# Patient Record
Sex: MALE | Race: WHITE | NOT HISPANIC OR LATINO | Employment: FULL TIME | URBAN - METROPOLITAN AREA
[De-identification: names, ages, dates, MRNs, and addresses within clinical notes are randomized per-mention and may not be internally consistent; named-entity substitution may affect disease eponyms.]

---

## 2017-02-15 ENCOUNTER — OFFICE VISIT (OUTPATIENT)
Dept: FAMILY MEDICINE CLINIC | Facility: CLINIC | Age: 57
End: 2017-02-15

## 2017-02-15 VITALS
DIASTOLIC BLOOD PRESSURE: 88 MMHG | BODY MASS INDEX: 27.87 KG/M2 | SYSTOLIC BLOOD PRESSURE: 130 MMHG | HEIGHT: 70 IN | HEART RATE: 63 BPM | OXYGEN SATURATION: 98 % | RESPIRATION RATE: 18 BRPM | WEIGHT: 194.7 LBS

## 2017-02-15 DIAGNOSIS — I10 ESSENTIAL HYPERTENSION: Primary | ICD-10-CM

## 2017-02-15 DIAGNOSIS — R35.1 NOCTURIA: ICD-10-CM

## 2017-02-15 PROCEDURE — 99386 PREV VISIT NEW AGE 40-64: CPT | Performed by: NURSE PRACTITIONER

## 2017-02-15 RX ORDER — LOSARTAN POTASSIUM 25 MG/1
25 TABLET ORAL DAILY
Qty: 90 TABLET | Refills: 2 | Status: SHIPPED | OUTPATIENT
Start: 2017-02-15 | End: 2017-10-30 | Stop reason: SINTOL

## 2017-02-15 RX ORDER — METOPROLOL SUCCINATE 50 MG/1
50 TABLET, EXTENDED RELEASE ORAL DAILY
Qty: 90 TABLET | Refills: 2 | Status: SHIPPED | OUTPATIENT
Start: 2017-02-15 | End: 2017-11-17 | Stop reason: SDUPTHER

## 2017-02-15 RX ORDER — METOPROLOL SUCCINATE 50 MG/1
TABLET, EXTENDED RELEASE ORAL
COMMUNITY
Start: 2017-01-30 | End: 2017-02-15 | Stop reason: SDUPTHER

## 2017-02-15 RX ORDER — LOSARTAN POTASSIUM 25 MG/1
TABLET ORAL
COMMUNITY
Start: 2017-01-26 | End: 2017-02-15 | Stop reason: SDUPTHER

## 2017-02-15 RX ORDER — TRIAMTERENE AND HYDROCHLOROTHIAZIDE 37.5; 25 MG/1; MG/1
1 TABLET ORAL DAILY
COMMUNITY
End: 2017-02-15 | Stop reason: SDUPTHER

## 2017-02-15 RX ORDER — TRIAMTERENE AND HYDROCHLOROTHIAZIDE 37.5; 25 MG/1; MG/1
1 TABLET ORAL DAILY
Qty: 90 TABLET | Refills: 2 | Status: SHIPPED | OUTPATIENT
Start: 2017-02-15 | End: 2018-01-18

## 2017-02-15 NOTE — PROGRESS NOTES
"Subjective   Shon Sheikh is a 56 y.o. male. Here to establish care and get refills on his medications.  PMHX: Htn  PSHX rhinoplasty   PFHX: HTN parents  Diabetes: Father  Recent move from [place]  Employed as a manager of a [employer].  Exercises daily.    Chief Complaint   Patient presents with   • Establish Care     1st visit   • Hypertension     would like medication refills.     Social History   Substance Use Topics   • Smoking status: Never Smoker   • Smokeless tobacco: Never Used   • Alcohol use 1.8 oz/week     1 Glasses of wine, 1 Cans of beer, 1 Shots of liquor per week       History of Present Illness     The following portions of the patient's history were reviewed and updated as appropriate: allergies, current medications, past social history and problem list.  Review of Systems   Genitourinary: Positive for frequency.        Nocturia   All other systems reviewed and are negative.      Objective   Vitals:    02/15/17 0802   BP: 130/88   Pulse: 63   Resp: 18   SpO2: 98%   Weight: 194 lb 11.2 oz (88.3 kg)   Height: 69.5\" (176.5 cm)     Body mass index is 28.34 kg/(m^2).    Physical Exam   Constitutional: He appears well-developed and well-nourished. No distress.   HENT:   Head: Normocephalic.   Right Ear: External ear normal.   Left Ear: External ear normal.   Mouth/Throat: Oropharynx is clear and moist.   Eyes: Conjunctivae and EOM are normal.   Neck: Neck supple.   Cardiovascular: Normal rate and regular rhythm.    Pulmonary/Chest: Effort normal and breath sounds normal.   Abdominal: Soft. Bowel sounds are normal.   Musculoskeletal: Normal range of motion.   Neurological: He is alert.   Skin: Skin is warm.   Nursing note and vitals reviewed.    Discussed his nocturia and gave him the option of placing him on a medication or going to see urology and he did not want to do either. PSA numbers in the past were normal and will try to get his medical records from last physician.    Assessment/Plan "   Problem List Items Addressed This Visit     None      Visit Diagnoses     Essential hypertension    -  Primary    Relevant Medications    losartan (COZAAR) 25 MG tablet    metoprolol succinate XL (TOPROL-XL) 50 MG 24 hr tablet    triamterene-hydrochlorothiazide (MAXZIDE-25) 37.5-25 MG per tablet    Nocturia

## 2017-04-26 ENCOUNTER — OFFICE VISIT (OUTPATIENT)
Dept: FAMILY MEDICINE CLINIC | Facility: CLINIC | Age: 57
End: 2017-04-26

## 2017-04-26 VITALS
WEIGHT: 194.7 LBS | OXYGEN SATURATION: 97 % | DIASTOLIC BLOOD PRESSURE: 86 MMHG | SYSTOLIC BLOOD PRESSURE: 128 MMHG | HEART RATE: 67 BPM | HEIGHT: 70 IN | BODY MASS INDEX: 27.87 KG/M2

## 2017-04-26 DIAGNOSIS — G47.30 SLEEP APNEA, UNSPECIFIED TYPE: Primary | ICD-10-CM

## 2017-04-26 DIAGNOSIS — L98.9 SKIN LESION OF SCALP: ICD-10-CM

## 2017-04-26 PROCEDURE — 99213 OFFICE O/P EST LOW 20 MIN: CPT | Performed by: NURSE PRACTITIONER

## 2017-04-26 RX ORDER — ASPIRIN 81 MG/1
81 TABLET ORAL DAILY
COMMUNITY
End: 2023-01-27

## 2017-04-26 NOTE — PROGRESS NOTES
"Subjective   Shon Sheikh is a 56 y.o. male.     Chief Complaint   Patient presents with   • Breathing Problem     Having diffiuculty breathing through nose. Broke nose in 2007 and had surgery 2007 snores at night and stops breathing somtimes    • Suspicious Skin Lesion     three on top of head     Social History   Substance Use Topics   • Smoking status: Never Smoker   • Smokeless tobacco: Never Used   • Alcohol use 1.8 oz/week     1 Glasses of wine, 1 Cans of beer, 1 Shots of liquor per week       History of Present Illness      Is having trouble breathing through his nose for the past 2 years. Compensates by mouth breathing. Had Rhinoplasty in 2008 and was better but now going downhill.  Reports that he snores a lot. Has never had a sleep study.  Is having trouble sleeping    Spots on the top of his head and he noticed about a week ago. No pain, pruritis or burning. Have not changed in color or symmetry according to his girlfriend    Review of Systems   Respiratory:        Difficulty breathing through nose.    Skin:        Spots on  Top of head       Objective   Vitals:    04/26/17 0838   BP: 128/86   Pulse: 67   SpO2: 97%   Weight: 194 lb 11.2 oz (88.3 kg)   Height: 69.5\" (176.5 cm)     Body mass index is 28.34 kg/(m^2).    Physical Exam   Constitutional: He appears well-developed and well-nourished. No distress.   HENT:   Head: Normocephalic.   Right Ear: External ear normal.   Left Ear: External ear normal.   Nose: Nose normal.   Mouth/Throat: Oropharynx is clear and moist.   Eyes: EOM are normal. Pupils are equal, round, and reactive to light.   Neck: Neck supple.   Cardiovascular: Normal rate and regular rhythm.    Pulmonary/Chest: Effort normal and breath sounds normal.   Musculoskeletal: Normal range of motion.   Neurological: He is alert.   Skin: Skin is warm.   3 small macular lesion to top of scalp, no bleeding or asymmetry or change in color.   Psychiatric: He has a normal mood and affect.   Nursing " note and vitals reviewed.      Assessment/Plan   Problem List Items Addressed This Visit     None      Visit Diagnoses     Sleep apnea, unspecified type    -  Primary    Relevant Orders    Ambulatory Referral to ENT (Otolaryngology)    Skin lesion of scalp

## 2017-10-05 ENCOUNTER — CLINICAL SUPPORT (OUTPATIENT)
Dept: FAMILY MEDICINE CLINIC | Facility: CLINIC | Age: 57
End: 2017-10-05

## 2017-10-05 DIAGNOSIS — Z23 NEED FOR VACCINATION: Primary | ICD-10-CM

## 2017-10-05 PROCEDURE — 90686 IIV4 VACC NO PRSV 0.5 ML IM: CPT | Performed by: NURSE PRACTITIONER

## 2017-10-05 PROCEDURE — 90471 IMMUNIZATION ADMIN: CPT | Performed by: NURSE PRACTITIONER

## 2017-10-25 ENCOUNTER — OFFICE VISIT (OUTPATIENT)
Dept: FAMILY MEDICINE CLINIC | Facility: CLINIC | Age: 57
End: 2017-10-25

## 2017-10-25 VITALS
HEIGHT: 70 IN | WEIGHT: 192 LBS | BODY MASS INDEX: 27.49 KG/M2 | HEART RATE: 80 BPM | DIASTOLIC BLOOD PRESSURE: 88 MMHG | OXYGEN SATURATION: 98 % | SYSTOLIC BLOOD PRESSURE: 132 MMHG

## 2017-10-25 DIAGNOSIS — N40.1 BENIGN PROSTATIC HYPERPLASIA WITH NOCTURIA: ICD-10-CM

## 2017-10-25 DIAGNOSIS — Z00.00 ANNUAL PHYSICAL EXAM: Primary | ICD-10-CM

## 2017-10-25 DIAGNOSIS — R35.1 BENIGN PROSTATIC HYPERPLASIA WITH NOCTURIA: ICD-10-CM

## 2017-10-25 DIAGNOSIS — I10 ESSENTIAL HYPERTENSION: ICD-10-CM

## 2017-10-25 LAB
ALBUMIN SERPL-MCNC: 4.5 G/DL (ref 3.5–5.2)
ALBUMIN/GLOB SERPL: 1.7 G/DL
ALP SERPL-CCNC: 60 U/L (ref 39–117)
ALT SERPL-CCNC: 22 U/L (ref 1–41)
AST SERPL-CCNC: 24 U/L (ref 1–40)
BILIRUB SERPL-MCNC: 0.5 MG/DL (ref 0.1–1.2)
BUN SERPL-MCNC: 21 MG/DL (ref 6–20)
BUN/CREAT SERPL: 15.3 (ref 7–25)
CALCIUM SERPL-MCNC: 9.5 MG/DL (ref 8.6–10.5)
CHLORIDE SERPL-SCNC: 101 MMOL/L (ref 98–107)
CHOLEST SERPL-MCNC: 219 MG/DL (ref 0–200)
CHOLEST/HDLC SERPL: 5.62 {RATIO}
CO2 SERPL-SCNC: 29 MMOL/L (ref 22–29)
CREAT SERPL-MCNC: 1.37 MG/DL (ref 0.76–1.27)
GFR SERPLBLD CREATININE-BSD FMLA CKD-EPI: 54 ML/MIN/1.73
GFR SERPLBLD CREATININE-BSD FMLA CKD-EPI: 65 ML/MIN/1.73
GLOBULIN SER CALC-MCNC: 2.7 GM/DL
GLUCOSE SERPL-MCNC: 91 MG/DL (ref 65–99)
HDLC SERPL-MCNC: 39 MG/DL (ref 40–60)
LDLC SERPL CALC-MCNC: 155 MG/DL (ref 0–100)
POTASSIUM SERPL-SCNC: 4.6 MMOL/L (ref 3.5–5.2)
PROT SERPL-MCNC: 7.2 G/DL (ref 6–8.5)
PSA SERPL-MCNC: 2.54 NG/ML (ref 0–4)
SODIUM SERPL-SCNC: 141 MMOL/L (ref 136–145)
TRIGL SERPL-MCNC: 124 MG/DL (ref 0–150)
VLDLC SERPL CALC-MCNC: 24.8 MG/DL (ref 5–40)

## 2017-10-25 PROCEDURE — 99396 PREV VISIT EST AGE 40-64: CPT | Performed by: NURSE PRACTITIONER

## 2017-10-25 NOTE — PROGRESS NOTES
"Shon Sheikh is a 57 y.o. male.  Seen 10/25/2017    Assessment/Plan   Problem List Items Addressed This Visit     None      Visit Diagnoses     Annual physical exam    -  Primary    Relevant Orders    Comprehensive Metabolic Panel    Lipid Panel With / Chol / HDL Ratio    Benign prostatic hyperplasia with nocturia        Relevant Orders    PSA    Essential hypertension          PMHX: Htn stable on his medications  PSHX:Rhinoplasty, hernia repair  PFHX: Father recently diagnosed with pancreatic cancer  Exercises a couple times a week.  Recent trouble with nocturia         No Follow-up on file.  There are no Patient Instructions on file for this visit.    Subjective     Chief Complaint   Patient presents with   • Annual Exam     Social History   Substance Use Topics   • Smoking status: Never Smoker   • Smokeless tobacco: Never Used   • Alcohol use 1.8 oz/week     1 Glasses of wine, 1 Cans of beer, 1 Shots of liquor per week       History of Present Illness     The following portions of the patient's history were reviewed and updated as appropriate:PMHroutine: Social history , Past Medical History, Surgical history , Allergies, Current Medications, Active Problem List, Family History and Health Maintenance    Review of Systems   Constitutional: Negative for activity change, appetite change and fatigue.   Genitourinary: Positive for frequency.       Objective   Vitals:    10/25/17 0803   BP: 132/88   Pulse: 80   SpO2: 98%   Weight: 192 lb (87.1 kg)   Height: 69.5\" (176.5 cm)     Body mass index is 27.95 kg/(m^2).  Physical Exam   Constitutional: He appears well-developed and well-nourished. No distress.   HENT:   Head: Normocephalic and atraumatic.   Right Ear: External ear normal.   Left Ear: External ear normal.   Mouth/Throat: Oropharynx is clear and moist.   Eyes: EOM are normal. Pupils are equal, round, and reactive to light.   Cardiovascular: Normal rate and regular rhythm.    Pulmonary/Chest: Effort normal and " breath sounds normal.   Abdominal: Soft. Bowel sounds are normal.   Musculoskeletal: Normal range of motion.   Neurological: He is alert.   Skin: Skin is warm and dry.   Nursing note and vitals reviewed.    Reviewed Data:  No results found for any previous visit.    Will call with lab results, I believe he is starting to have some BPH symptoms, mostly at night has nocturia, psa's in the past have been normal. Will draw a PSA today and discuss results tomorrow/.

## 2017-10-30 DIAGNOSIS — I10 ESSENTIAL HYPERTENSION: Primary | ICD-10-CM

## 2017-10-30 RX ORDER — AMLODIPINE BESYLATE 5 MG/1
5 TABLET ORAL DAILY
Qty: 30 TABLET | Refills: 1 | Status: SHIPPED | OUTPATIENT
Start: 2017-10-30 | End: 2017-11-07 | Stop reason: SDUPTHER

## 2017-11-01 ENCOUNTER — TELEPHONE (OUTPATIENT)
Dept: FAMILY MEDICINE CLINIC | Facility: CLINIC | Age: 57
End: 2017-11-01

## 2017-11-07 DIAGNOSIS — I10 ESSENTIAL HYPERTENSION: ICD-10-CM

## 2017-11-07 RX ORDER — AMLODIPINE BESYLATE 5 MG/1
5 TABLET ORAL DAILY
Qty: 90 TABLET | Refills: 0 | Status: SHIPPED | OUTPATIENT
Start: 2017-11-07 | End: 2017-11-20

## 2017-11-17 RX ORDER — LOSARTAN POTASSIUM 25 MG/1
TABLET ORAL
Qty: 90 TABLET | Refills: 1 | Status: SHIPPED | OUTPATIENT
Start: 2017-11-17 | End: 2018-01-17

## 2017-11-17 RX ORDER — METOPROLOL SUCCINATE 50 MG/1
TABLET, EXTENDED RELEASE ORAL
Qty: 90 TABLET | Refills: 1 | Status: SHIPPED | OUTPATIENT
Start: 2017-11-17 | End: 2018-03-01 | Stop reason: SDUPTHER

## 2017-11-20 DIAGNOSIS — I10 ESSENTIAL HYPERTENSION: Primary | ICD-10-CM

## 2017-11-20 RX ORDER — AMLODIPINE BESYLATE 10 MG/1
10 TABLET ORAL DAILY
Qty: 30 TABLET | Refills: 2 | Status: SHIPPED | OUTPATIENT
Start: 2017-11-20 | End: 2018-01-27 | Stop reason: SDUPTHER

## 2017-12-04 ENCOUNTER — TELEPHONE (OUTPATIENT)
Dept: FAMILY MEDICINE CLINIC | Facility: CLINIC | Age: 57
End: 2017-12-04

## 2017-12-04 NOTE — TELEPHONE ENCOUNTER
----- Message from Odell Ball MD sent at 12/4/2017 11:47 AM EST -----  Regarding: FW: Visit Follow-Up Question  Contact: [phone]  Please let Shon know that Merry will be back next week and will review B/P readings at that time.  ----- Message -----     From: Eleni Phillips MA     Sent: 12/4/2017   9:23 AM       To: CARLO Arteaga  Subject: FW: Visit Follow-Up Question                         ----- Message -----     From: Amanda Sanders MA     Sent: 12/4/2017   7:41 AM       To: Eleni Phillips MA  Subject: FW: Visit Follow-Up Question                         ----- Message -----     From: Shon Sheikh     Sent: 12/2/2017  12:15 PM       To: Deya Shepard [facility]  Subject: RE: Visit Follow-Up Question                     Merry,    Below are my BP readings for the past 11 days.    Date Systolic Diastolic  22-Nov 122 88  23-Nov 120 90  24-Nov 100 80  25-Nov 26-Nov 100 86  27-Nov 110 82  28-Nov 29-Nov 30-Nov    1-Dec 122 94  2-Dec 120 82    Cr Maria  ----- Message -----  From: CARLO Iqbal  Sent: 11/20/2017  4:19 PM EST  To: Shon Sheikh  Subject: RE: Visit Follow-Up Question    Mr. Sheikh,  I am going to increase your amlodipine to 10 mgs daily and I want you to keep a BP log and let me know.I need 4 readings a week    ----- Message -----     From: Shon Sheikh     Sent: 11/20/2017  3:04 PM EST       To: CARLO Iqbal  Subject: Visit Follow-Up Question    Merry    Below are my BP readings for the past two weeks.  These were mostly taken first thing in the morning.  I'm thinking my diastolic reading is running a little high.  I'm looking for your thoughts.    Date Systolic Diastolic  2-Nov 122       92  3-Nov 120             90  4-Nov 122             92  5-Nov 124             80  6-Nov 130             90  7-Nov 120             88  8-Nov 126             98  9-Nov 120             90  10-Nov 120             90  11-Nov 100             80  12-Nov 110              82  13-Nov 108             80  14-Nov 129           100  15-Nov 116             80  16-Nov    17-Nov    18-Nov 114             90  19-Nov    20-Nov 122             90    Regards,    Cr

## 2017-12-14 ENCOUNTER — TELEPHONE (OUTPATIENT)
Dept: FAMILY MEDICINE CLINIC | Facility: CLINIC | Age: 57
End: 2017-12-14

## 2017-12-18 ENCOUNTER — TELEPHONE (OUTPATIENT)
Dept: FAMILY MEDICINE CLINIC | Facility: CLINIC | Age: 57
End: 2017-12-18

## 2017-12-18 DIAGNOSIS — R94.4 DECREASED GFR: Primary | ICD-10-CM

## 2017-12-18 DIAGNOSIS — R89.9 ABNORMAL LABORATORY TEST: Primary | ICD-10-CM

## 2017-12-18 LAB
ALBUMIN SERPL-MCNC: 4.5 G/DL (ref 3.5–5.2)
ALBUMIN/GLOB SERPL: 1.6 G/DL
ALP SERPL-CCNC: 74 U/L (ref 39–117)
ALT SERPL-CCNC: 25 U/L (ref 1–41)
AST SERPL-CCNC: 27 U/L (ref 1–40)
BILIRUB SERPL-MCNC: 0.3 MG/DL (ref 0.1–1.2)
BUN SERPL-MCNC: 29 MG/DL (ref 6–20)
BUN/CREAT SERPL: 22.5 (ref 7–25)
CALCIUM SERPL-MCNC: 9.6 MG/DL (ref 8.6–10.5)
CHLORIDE SERPL-SCNC: 100 MMOL/L (ref 98–107)
CO2 SERPL-SCNC: 33.9 MMOL/L (ref 22–29)
CREAT SERPL-MCNC: 1.29 MG/DL (ref 0.76–1.27)
GFR SERPLBLD CREATININE-BSD FMLA CKD-EPI: 58 ML/MIN/1.73
GFR SERPLBLD CREATININE-BSD FMLA CKD-EPI: 70 ML/MIN/1.73
GLOBULIN SER CALC-MCNC: 2.8 GM/DL
GLUCOSE SERPL-MCNC: 89 MG/DL (ref 65–99)
POTASSIUM SERPL-SCNC: 4.4 MMOL/L (ref 3.5–5.2)
PROT SERPL-MCNC: 7.3 G/DL (ref 6–8.5)
SODIUM SERPL-SCNC: 140 MMOL/L (ref 136–145)

## 2018-01-03 ENCOUNTER — TELEPHONE (OUTPATIENT)
Dept: FAMILY MEDICINE CLINIC | Facility: CLINIC | Age: 58
End: 2018-01-03

## 2018-01-03 NOTE — TELEPHONE ENCOUNTER
He is out of town until the 5th. I did ask him to make an appointment when he gets back into town.

## 2018-01-17 ENCOUNTER — OFFICE VISIT (OUTPATIENT)
Dept: FAMILY MEDICINE CLINIC | Facility: CLINIC | Age: 58
End: 2018-01-17

## 2018-01-17 ENCOUNTER — TELEPHONE (OUTPATIENT)
Dept: FAMILY MEDICINE CLINIC | Facility: CLINIC | Age: 58
End: 2018-01-17

## 2018-01-17 VITALS
WEIGHT: 193 LBS | DIASTOLIC BLOOD PRESSURE: 100 MMHG | SYSTOLIC BLOOD PRESSURE: 138 MMHG | HEART RATE: 74 BPM | HEIGHT: 70 IN | BODY MASS INDEX: 27.63 KG/M2 | OXYGEN SATURATION: 98 %

## 2018-01-17 DIAGNOSIS — R39.11 BENIGN PROSTATIC HYPERPLASIA WITH URINARY HESITANCY: Primary | ICD-10-CM

## 2018-01-17 DIAGNOSIS — N40.1 BENIGN PROSTATIC HYPERPLASIA WITH URINARY HESITANCY: Primary | ICD-10-CM

## 2018-01-17 DIAGNOSIS — R42 DIZZY: ICD-10-CM

## 2018-01-17 DIAGNOSIS — I10 ESSENTIAL HYPERTENSION: ICD-10-CM

## 2018-01-17 PROCEDURE — 99213 OFFICE O/P EST LOW 20 MIN: CPT | Performed by: NURSE PRACTITIONER

## 2018-01-17 RX ORDER — TAMSULOSIN HYDROCHLORIDE 0.4 MG/1
1 CAPSULE ORAL NIGHTLY
Qty: 30 CAPSULE | Refills: 1 | Status: SHIPPED | OUTPATIENT
Start: 2018-01-17 | End: 2018-03-04 | Stop reason: SDUPTHER

## 2018-01-17 NOTE — TELEPHONE ENCOUNTER
Jhoan Sousa!  I would make sure that he is compliant with a low sodium diet, especially coming off of the holidays.  You are welcome to send him here as well.  Dr. Jones and I often use chlorthalidone instead of HCTZ, so you could always try stopping the maxide and starting chlorthalidone.  Let me know if you would like us to see him, and I will work on getting him an appointment.  Thanks!  Veronica

## 2018-01-17 NOTE — PATIENT INSTRUCTIONS
Hypertension  Hypertension, commonly called high blood pressure, is when the force of blood pumping through your arteries is too strong. Your arteries are the blood vessels that carry blood from your heart throughout your body. A blood pressure reading consists of a higher number over a lower number, such as 110/72. The higher number (systolic) is the pressure inside your arteries when your heart pumps. The lower number (diastolic) is the pressure inside your arteries when your heart relaxes. Ideally you want your blood pressure below 120/80.  Hypertension forces your heart to work harder to pump blood. Your arteries may become narrow or stiff. Having untreated or uncontrolled hypertension can cause heart attack, stroke, kidney disease, and other problems.  What increases the risk?  Some risk factors for high blood pressure are controllable. Others are not.  Risk factors you cannot control include:  · Race. You may be at higher risk if you are .  · Age. Risk increases with age.  · Gender. Men are at higher risk than women before age 45 years. After age 65, women are at higher risk than men.  Risk factors you can control include:  · Not getting enough exercise or physical activity.  · Being overweight.  · Getting too much fat, sugar, calories, or salt in your diet.  · Drinking too much alcohol.  What are the signs or symptoms?  Hypertension does not usually cause signs or symptoms. Extremely high blood pressure (hypertensive crisis) may cause headache, anxiety, shortness of breath, and nosebleed.  How is this diagnosed?  To check if you have hypertension, your health care provider will measure your blood pressure while you are seated, with your arm held at the level of your heart. It should be measured at least twice using the same arm. Certain conditions can cause a difference in blood pressure between your right and left arms. A blood pressure reading that is higher than normal on one occasion does  not mean that you need treatment. If it is not clear whether you have high blood pressure, you may be asked to return on a different day to have your blood pressure checked again. Or, you may be asked to monitor your blood pressure at home for 1 or more weeks.  How is this treated?  Treating high blood pressure includes making lifestyle changes and possibly taking medicine. Living a healthy lifestyle can help lower high blood pressure. You may need to change some of your habits.  Lifestyle changes may include:  · Following the DASH diet. This diet is high in fruits, vegetables, and whole grains. It is low in salt, red meat, and added sugars.  · Keep your sodium intake below 2,300 mg per day.  · Getting at least 30-45 minutes of aerobic exercise at least 4 times per week.  · Losing weight if necessary.  · Not smoking.  · Limiting alcoholic beverages.  · Learning ways to reduce stress.  Your health care provider may prescribe medicine if lifestyle changes are not enough to get your blood pressure under control, and if one of the following is true:  · You are 18-59 years of age and your systolic blood pressure is above 140.  · You are 60 years of age or older, and your systolic blood pressure is above 150.  · Your diastolic blood pressure is above 90.  · You have diabetes, and your systolic blood pressure is over 140 or your diastolic blood pressure is over 90.  · You have kidney disease and your blood pressure is above 140/90.  · You have heart disease and your blood pressure is above 140/90.  Your personal target blood pressure may vary depending on your medical conditions, your age, and other factors.  Follow these instructions at home:  · Have your blood pressure rechecked as directed by your health care provider.  · Take medicines only as directed by your health care provider. Follow the directions carefully. Blood pressure medicines must be taken as prescribed. The medicine does not work as well when you skip  doses. Skipping doses also puts you at risk for problems.  · Do not smoke.  · Monitor your blood pressure at home as directed by your health care provider.  Contact a health care provider if:  · You think you are having a reaction to medicines taken.  · You have recurrent headaches or feel dizzy.  · You have swelling in your ankles.  · You have trouble with your vision.  Get help right away if:  · You develop a severe headache or confusion.  · You have unusual weakness, numbness, or feel faint.  · You have severe chest or abdominal pain.  · You vomit repeatedly.  · You have trouble breathing.  This information is not intended to replace advice given to you by your health care provider. Make sure you discuss any questions you have with your health care provider.  Document Released: 12/18/2006 Document Revised: 05/25/2017 Document Reviewed: 10/10/2014  [employer] Interactive Patient Education © 2017 [employer]

## 2018-01-17 NOTE — PROGRESS NOTES
"Shon Sheikh is a 57 y.o. male.Patient presents for a hypertension check. He does monitor his blood pressure at home.   He has not been working out much but walks daily.  He also reports that he has been having trouble with his urine stream being weak and sometimes he has problems emptying his bladder.    Also reports that sometimes when he stands up fast he feels dizzy for a couple of seconds      Assessment/Plan   Problem List Items Addressed This Visit     None      Visit Diagnoses     Benign prostatic hyperplasia with urinary hesitancy    -  Primary    Relevant Medications    tamsulosin (FLOMAX) 0.4 MG capsule 24 hr capsule    Essential hypertension        Dizzy                 No Follow-up on file.  There are no Patient Instructions on file for this visit.    Chief Complaint   Patient presents with   • Hypertension     Social History   Substance Use Topics   • Smoking status: Never Smoker   • Smokeless tobacco: Never Used   • Alcohol use 1.8 oz/week     1 Glasses of wine, 1 Cans of beer, 1 Shots of liquor per week       History of Present Illness     The following portions of the patient's history were reviewed and updated as appropriate:PMHroutine: Social history , Allergies, Current Medications and Active Problem List    Review of Systems   Constitutional: Negative for activity change and appetite change.   Respiratory: Negative for cough and shortness of breath.    Cardiovascular: Negative for chest pain and palpitations.   Neurological: Positive for dizziness.       Objective   Vitals:    01/17/18 0745   BP: 138/100   Pulse: 74   SpO2: 98%   Weight: 87.5 kg (193 lb)   Height: 176.5 cm (69.5\")     Body mass index is 28.09 kg/(m^2).  Physical Exam   Constitutional: He appears well-developed and well-nourished. No distress.   HENT:   Head: Normocephalic and atraumatic.   Cardiovascular: Normal rate and regular rhythm.    Pulmonary/Chest: Effort normal and breath sounds normal.   Musculoskeletal: Normal range " of motion.   Neurological: He is alert.   Nursing note and vitals reviewed.    Reviewed Data:  Orders Only on 12/18/2017   Component Date Value Ref Range Status   • Glucose 12/18/2017 89  65 - 99 mg/dL Final   • BUN 12/18/2017 29* 6 - 20 mg/dL Final   • Creatinine 12/18/2017 1.29* 0.76 - 1.27 mg/dL Final   • eGFR Non African Am 12/18/2017 58* >60 mL/min/1.73 Final   • eGFR African Am 12/18/2017 70  >60 mL/min/1.73 Final   • BUN/Creatinine Ratio 12/18/2017 22.5  7.0 - 25.0 Final   • Sodium 12/18/2017 140  136 - 145 mmol/L Final   • Potassium 12/18/2017 4.4  3.5 - 5.2 mmol/L Final   • Chloride 12/18/2017 100  98 - 107 mmol/L Final   • Total CO2 12/18/2017 33.9* 22.0 - 29.0 mmol/L Final   • Calcium 12/18/2017 9.6  8.6 - 10.5 mg/dL Final   • Total Protein 12/18/2017 7.3  6.0 - 8.5 g/dL Final   • Albumin 12/18/2017 4.50  3.50 - 5.20 g/dL Final   • Globulin 12/18/2017 2.8  gm/dL Final   • A/G Ratio 12/18/2017 1.6  g/dL Final   • Total Bilirubin 12/18/2017 0.3  0.1 - 1.2 mg/dL Final   • Alkaline Phosphatase 12/18/2017 74  39 - 117 U/L Final   • AST (SGOT) 12/18/2017 27  1 - 40 U/L Final   • ALT (SGPT) 12/18/2017 25  1 - 41 U/L Final     Discussed seeing cardiology but he would like to wait, I instructed him to drink more water and start exercising and he concurs.

## 2018-01-18 DIAGNOSIS — I10 ESSENTIAL HYPERTENSION: Primary | ICD-10-CM

## 2018-01-18 RX ORDER — CHLORTHALIDONE 25 MG/1
25 TABLET ORAL DAILY
Qty: 30 TABLET | Refills: 0 | Status: SHIPPED | OUTPATIENT
Start: 2018-01-18 | End: 2018-02-17 | Stop reason: SDUPTHER

## 2018-01-27 DIAGNOSIS — I10 ESSENTIAL HYPERTENSION: ICD-10-CM

## 2018-01-30 RX ORDER — AMLODIPINE BESYLATE 10 MG/1
TABLET ORAL
Qty: 30 TABLET | Refills: 5 | Status: SHIPPED | OUTPATIENT
Start: 2018-01-30 | End: 2018-02-25 | Stop reason: SDUPTHER

## 2018-02-02 ENCOUNTER — TELEPHONE (OUTPATIENT)
Dept: FAMILY MEDICINE CLINIC | Facility: CLINIC | Age: 58
End: 2018-02-02

## 2018-02-02 DIAGNOSIS — I10 UNCONTROLLED HYPERTENSION: Primary | ICD-10-CM

## 2018-02-02 NOTE — TELEPHONE ENCOUNTER
[phone] GF called stating that patient's blood pressure is still elevated, they think its time for a cardiology referral.

## 2018-02-17 DIAGNOSIS — I10 ESSENTIAL HYPERTENSION: ICD-10-CM

## 2018-02-19 LAB
ALBUMIN SERPL-MCNC: 4.4 G/DL (ref 3.5–5.2)
ALBUMIN/GLOB SERPL: 1.8 G/DL
ALP SERPL-CCNC: 57 U/L (ref 39–117)
ALT SERPL-CCNC: 25 U/L (ref 1–41)
AST SERPL-CCNC: 28 U/L (ref 1–40)
BILIRUB SERPL-MCNC: 0.5 MG/DL (ref 0.1–1.2)
BUN SERPL-MCNC: 26 MG/DL (ref 6–20)
BUN/CREAT SERPL: 19.3 (ref 7–25)
CALCIUM SERPL-MCNC: 9.6 MG/DL (ref 8.6–10.5)
CHLORIDE SERPL-SCNC: 100 MMOL/L (ref 98–107)
CO2 SERPL-SCNC: 30.7 MMOL/L (ref 22–29)
CREAT SERPL-MCNC: 1.35 MG/DL (ref 0.76–1.27)
GFR SERPLBLD CREATININE-BSD FMLA CKD-EPI: 55 ML/MIN/1.73
GFR SERPLBLD CREATININE-BSD FMLA CKD-EPI: 66 ML/MIN/1.73
GLOBULIN SER CALC-MCNC: 2.4 GM/DL
GLUCOSE SERPL-MCNC: 90 MG/DL (ref 65–99)
POTASSIUM SERPL-SCNC: 4 MMOL/L (ref 3.5–5.2)
PROT SERPL-MCNC: 6.8 G/DL (ref 6–8.5)
SODIUM SERPL-SCNC: 144 MMOL/L (ref 136–145)

## 2018-02-19 RX ORDER — CHLORTHALIDONE 25 MG/1
TABLET ORAL
Qty: 30 TABLET | Refills: 3 | Status: SHIPPED | OUTPATIENT
Start: 2018-02-19 | End: 2018-03-20 | Stop reason: ALTCHOICE

## 2018-02-20 LAB
CHOLEST SERPL-MCNC: 233 MG/DL (ref 0–200)
HDLC SERPL-MCNC: 47 MG/DL (ref 40–60)
LDLC SERPL CALC-MCNC: 164 MG/DL (ref 0–100)
Lab: NORMAL
SPECIMEN STATUS: NORMAL
TRIGL SERPL-MCNC: 110 MG/DL (ref 0–150)
VLDLC SERPL CALC-MCNC: 22 MG/DL (ref 5–40)

## 2018-02-21 DIAGNOSIS — R89.9 ABNORMAL LABORATORY TEST: Primary | ICD-10-CM

## 2018-02-25 DIAGNOSIS — I10 ESSENTIAL HYPERTENSION: ICD-10-CM

## 2018-02-26 RX ORDER — AMLODIPINE BESYLATE 10 MG/1
TABLET ORAL
Qty: 30 TABLET | Refills: 4 | Status: SHIPPED | OUTPATIENT
Start: 2018-02-26 | End: 2018-03-20 | Stop reason: SDUPTHER

## 2018-03-01 RX ORDER — METOPROLOL SUCCINATE 100 MG/1
100 TABLET, EXTENDED RELEASE ORAL DAILY
Qty: 30 TABLET | Refills: 0 | OUTPATIENT
Start: 2018-03-01 | End: 2018-03-20 | Stop reason: SDUPTHER

## 2018-03-04 DIAGNOSIS — R39.11 BENIGN PROSTATIC HYPERPLASIA WITH URINARY HESITANCY: ICD-10-CM

## 2018-03-04 DIAGNOSIS — N40.1 BENIGN PROSTATIC HYPERPLASIA WITH URINARY HESITANCY: ICD-10-CM

## 2018-03-05 RX ORDER — TAMSULOSIN HYDROCHLORIDE 0.4 MG/1
CAPSULE ORAL
Qty: 30 CAPSULE | Refills: 5 | Status: SHIPPED | OUTPATIENT
Start: 2018-03-05 | End: 2018-03-20 | Stop reason: SDUPTHER

## 2018-03-20 ENCOUNTER — OFFICE VISIT (OUTPATIENT)
Dept: CARDIOLOGY | Facility: CLINIC | Age: 58
End: 2018-03-20

## 2018-03-20 VITALS
HEART RATE: 45 BPM | BODY MASS INDEX: 28.58 KG/M2 | DIASTOLIC BLOOD PRESSURE: 78 MMHG | SYSTOLIC BLOOD PRESSURE: 122 MMHG | HEIGHT: 69 IN | WEIGHT: 193 LBS

## 2018-03-20 DIAGNOSIS — R00.1 BRADYCARDIA: ICD-10-CM

## 2018-03-20 DIAGNOSIS — I10 ESSENTIAL HYPERTENSION: ICD-10-CM

## 2018-03-20 DIAGNOSIS — I10 ESSENTIAL HYPERTENSION: Primary | ICD-10-CM

## 2018-03-20 DIAGNOSIS — N18.9 CHRONIC KIDNEY DISEASE, UNSPECIFIED CKD STAGE: ICD-10-CM

## 2018-03-20 DIAGNOSIS — N40.1 BENIGN PROSTATIC HYPERPLASIA WITH URINARY HESITANCY: ICD-10-CM

## 2018-03-20 DIAGNOSIS — R39.11 BENIGN PROSTATIC HYPERPLASIA WITH URINARY HESITANCY: ICD-10-CM

## 2018-03-20 DIAGNOSIS — E78.5 HYPERLIPIDEMIA, UNSPECIFIED HYPERLIPIDEMIA TYPE: ICD-10-CM

## 2018-03-20 PROCEDURE — 99203 OFFICE O/P NEW LOW 30 MIN: CPT | Performed by: INTERNAL MEDICINE

## 2018-03-20 PROCEDURE — 93000 ELECTROCARDIOGRAM COMPLETE: CPT | Performed by: INTERNAL MEDICINE

## 2018-03-20 RX ORDER — TAMSULOSIN HYDROCHLORIDE 0.4 MG/1
1 CAPSULE ORAL NIGHTLY
Qty: 90 CAPSULE | Refills: 1 | Status: SHIPPED | OUTPATIENT
Start: 2018-03-20 | End: 2018-04-10 | Stop reason: SDUPTHER

## 2018-03-20 RX ORDER — AMLODIPINE BESYLATE 10 MG/1
10 TABLET ORAL DAILY
Qty: 90 TABLET | Refills: 1 | Status: SHIPPED | OUTPATIENT
Start: 2018-03-20 | End: 2018-12-18 | Stop reason: SDUPTHER

## 2018-03-20 RX ORDER — METOPROLOL SUCCINATE 100 MG/1
100 TABLET, EXTENDED RELEASE ORAL DAILY
Qty: 90 TABLET | Refills: 1 | OUTPATIENT
Start: 2018-03-20 | End: 2018-04-10 | Stop reason: SDUPTHER

## 2018-04-10 DIAGNOSIS — N40.1 BENIGN PROSTATIC HYPERPLASIA WITH URINARY HESITANCY: ICD-10-CM

## 2018-04-10 DIAGNOSIS — R39.11 BENIGN PROSTATIC HYPERPLASIA WITH URINARY HESITANCY: ICD-10-CM

## 2018-04-10 RX ORDER — METOPROLOL SUCCINATE 100 MG/1
100 TABLET, EXTENDED RELEASE ORAL DAILY
Qty: 90 TABLET | Refills: 0 | OUTPATIENT
Start: 2018-04-10 | End: 2018-04-13 | Stop reason: SDUPTHER

## 2018-04-10 RX ORDER — TAMSULOSIN HYDROCHLORIDE 0.4 MG/1
1 CAPSULE ORAL NIGHTLY
Qty: 90 CAPSULE | Refills: 0 | Status: SHIPPED | OUTPATIENT
Start: 2018-04-10 | End: 2018-07-14 | Stop reason: SDUPTHER

## 2018-04-13 ENCOUNTER — TELEPHONE (OUTPATIENT)
Dept: FAMILY MEDICINE CLINIC | Facility: CLINIC | Age: 58
End: 2018-04-13

## 2018-04-13 RX ORDER — METOPROLOL SUCCINATE 100 MG/1
100 TABLET, EXTENDED RELEASE ORAL DAILY
Qty: 90 TABLET | Refills: 0 | OUTPATIENT
Start: 2018-04-13 | End: 2018-04-23 | Stop reason: SDUPTHER

## 2018-04-23 DIAGNOSIS — I10 ESSENTIAL HYPERTENSION: Primary | ICD-10-CM

## 2018-04-23 RX ORDER — METOPROLOL SUCCINATE 100 MG/1
100 TABLET, EXTENDED RELEASE ORAL DAILY
Qty: 90 TABLET | Refills: 0 | OUTPATIENT
Start: 2018-04-23 | End: 2018-07-20 | Stop reason: SDUPTHER

## 2018-05-07 ENCOUNTER — OFFICE VISIT (OUTPATIENT)
Dept: FAMILY MEDICINE CLINIC | Facility: CLINIC | Age: 58
End: 2018-05-07

## 2018-05-07 VITALS
SYSTOLIC BLOOD PRESSURE: 120 MMHG | TEMPERATURE: 98.4 F | HEIGHT: 69 IN | DIASTOLIC BLOOD PRESSURE: 80 MMHG | RESPIRATION RATE: 16 BRPM | WEIGHT: 192 LBS | OXYGEN SATURATION: 97 % | BODY MASS INDEX: 28.44 KG/M2 | HEART RATE: 60 BPM

## 2018-05-07 DIAGNOSIS — J40 BRONCHITIS: Primary | ICD-10-CM

## 2018-05-07 PROCEDURE — 99213 OFFICE O/P EST LOW 20 MIN: CPT | Performed by: NURSE PRACTITIONER

## 2018-05-07 RX ORDER — FLUTICASONE PROPIONATE 50 MCG
SPRAY, SUSPENSION (ML) NASAL
Refills: 6 | COMMUNITY
Start: 2018-04-25 | End: 2020-01-10

## 2018-05-07 RX ORDER — AMOXICILLIN 500 MG/1
1000 CAPSULE ORAL 2 TIMES DAILY
Qty: 40 CAPSULE | Refills: 0 | Status: SHIPPED | OUTPATIENT
Start: 2018-05-07 | End: 2018-11-07

## 2018-05-07 NOTE — PROGRESS NOTES
"Shon Sheikh is a 57 y.o. male.Patient states that for 4 days he has had a productive cough, nasal congestion, sore throat, and chills. Using Coricidin and nasal rinse.       Assessment/Plan   Problem List Items Addressed This Visit     None      Visit Diagnoses     Bronchitis    -  Primary    Relevant Medications    fluticasone (FLONASE) 50 MCG/ACT nasal spray    amoxicillin (AMOXIL) 500 MG capsule             No Follow-up on file.  There are no Patient Instructions on file for this visit.    Chief Complaint   Patient presents with   • Cough     Social History   Substance Use Topics   • Smoking status: Never Smoker   • Smokeless tobacco: Never Used   • Alcohol use 1.8 oz/week     1 Glasses of wine, 1 Cans of beer, 1 Shots of liquor per week       History of Present Illness     The following portions of the patient's history were reviewed and updated as appropriate:PMHroutine: Social history , Allergies, Current Medications and Active Problem List    Review of Systems   Constitutional: Positive for fatigue.   HENT: Positive for postnasal drip, rhinorrhea, sinus pain and sinus pressure.    Respiratory: Positive for cough.        Objective   Vitals:    05/07/18 1449   BP: 120/80   Pulse: 60   Resp: 16   Temp: 98.4 °F (36.9 °C)   SpO2: 97%   Weight: 87.1 kg (192 lb)   Height: 175.3 cm (69\")     Body mass index is 28.35 kg/m².  Physical Exam   Constitutional: He appears well-developed and well-nourished. He appears distressed.   HENT:   Head: Normocephalic and atraumatic.   Right Ear: External ear normal.   Left Ear: External ear normal.   Opvred with drainage, + pain and pressure to maxillary sinuses with palpation.   Eyes: EOM are normal.   Neck: Neck supple.   Cardiovascular: Normal rate and regular rhythm.    Pulmonary/Chest: Effort normal and breath sounds normal.   Musculoskeletal: Normal range of motion.   Lymphadenopathy:     He has no cervical adenopathy.   Neurological: He is alert.   Skin: Skin is warm. "   Nursing note and vitals reviewed.    Reviewed Data:  No visits with results within 1 Month(s) from this visit.   Latest known visit with results is:   Orders Only on 12/18/2017   Component Date Value Ref Range Status   • Glucose 02/19/2018 90  65 - 99 mg/dL Final   • BUN 02/19/2018 26* 6 - 20 mg/dL Final   • Creatinine 02/19/2018 1.35* 0.76 - 1.27 mg/dL Final   • eGFR Non  Am 02/19/2018 55* >60 mL/min/1.73 Final   • eGFR African Am 02/19/2018 66  >60 mL/min/1.73 Final   • BUN/Creatinine Ratio 02/19/2018 19.3  7.0 - 25.0 Final   • Sodium 02/19/2018 144  136 - 145 mmol/L Final   • Potassium 02/19/2018 4.0  3.5 - 5.2 mmol/L Final   • Chloride 02/19/2018 100  98 - 107 mmol/L Final   • Total CO2 02/19/2018 30.7* 22.0 - 29.0 mmol/L Final   • Calcium 02/19/2018 9.6  8.6 - 10.5 mg/dL Final   • Total Protein 02/19/2018 6.8  6.0 - 8.5 g/dL Final   • Albumin 02/19/2018 4.40  3.50 - 5.20 g/dL Final   • Globulin 02/19/2018 2.4  gm/dL Final   • A/G Ratio 02/19/2018 1.8  g/dL Final   • Total Bilirubin 02/19/2018 0.5  0.1 - 1.2 mg/dL Final   • Alkaline Phosphatase 02/19/2018 57  39 - 117 U/L Final   • AST (SGOT) 02/19/2018 28  1 - 40 U/L Final   • ALT (SGPT) 02/19/2018 25  1 - 41 U/L Final   • Please note 02/20/2018 Comment   Final    Comment: We have received your request for additional testing or test  verification.  You will be notified if we are unable to process  your request.     • Specimen Status 02/20/2018 Comment   Final    Comment: Written Authorization  Written Authorization  Written Authorization Received.  Authorization received from WRITTEN REQUEST 02-  Logged by Kevyn Murillo     • Total Cholesterol 02/20/2018 233* 0 - 200 mg/dL Final   • Triglycerides 02/20/2018 110  0 - 150 mg/dL Final   • HDL Cholesterol 02/20/2018 47  40 - 60 mg/dL Final   • VLDL Cholesterol 02/20/2018 22  5 - 40 mg/dL Final   • LDL Cholesterol  02/20/2018 164* 0 - 100 mg/dL Final

## 2018-05-24 ENCOUNTER — TELEPHONE (OUTPATIENT)
Dept: FAMILY MEDICINE CLINIC | Facility: CLINIC | Age: 58
End: 2018-05-24

## 2018-07-14 DIAGNOSIS — N40.1 BENIGN PROSTATIC HYPERPLASIA WITH URINARY HESITANCY: ICD-10-CM

## 2018-07-14 DIAGNOSIS — R39.11 BENIGN PROSTATIC HYPERPLASIA WITH URINARY HESITANCY: ICD-10-CM

## 2018-07-16 RX ORDER — TAMSULOSIN HYDROCHLORIDE 0.4 MG/1
1 CAPSULE ORAL NIGHTLY
Qty: 90 CAPSULE | Refills: 0 | Status: SHIPPED | OUTPATIENT
Start: 2018-07-16 | End: 2018-10-15 | Stop reason: SDUPTHER

## 2018-07-20 DIAGNOSIS — I10 ESSENTIAL HYPERTENSION: ICD-10-CM

## 2018-07-23 RX ORDER — METOPROLOL SUCCINATE 100 MG/1
TABLET, EXTENDED RELEASE ORAL
Qty: 90 TABLET | Refills: 0 | Status: SHIPPED | OUTPATIENT
Start: 2018-07-23 | End: 2018-10-17 | Stop reason: SDUPTHER

## 2018-10-15 DIAGNOSIS — N40.1 BENIGN PROSTATIC HYPERPLASIA WITH URINARY HESITANCY: ICD-10-CM

## 2018-10-15 DIAGNOSIS — R39.11 BENIGN PROSTATIC HYPERPLASIA WITH URINARY HESITANCY: ICD-10-CM

## 2018-10-15 RX ORDER — TAMSULOSIN HYDROCHLORIDE 0.4 MG/1
1 CAPSULE ORAL NIGHTLY
Qty: 90 CAPSULE | Refills: 0 | Status: SHIPPED | OUTPATIENT
Start: 2018-10-15 | End: 2019-04-18 | Stop reason: SDUPTHER

## 2018-10-17 DIAGNOSIS — I10 ESSENTIAL HYPERTENSION: ICD-10-CM

## 2018-10-17 RX ORDER — METOPROLOL SUCCINATE 100 MG/1
TABLET, EXTENDED RELEASE ORAL
Qty: 90 TABLET | Refills: 0 | Status: SHIPPED | OUTPATIENT
Start: 2018-10-17 | End: 2019-01-13 | Stop reason: SDUPTHER

## 2018-11-07 ENCOUNTER — OFFICE VISIT (OUTPATIENT)
Dept: FAMILY MEDICINE CLINIC | Facility: CLINIC | Age: 58
End: 2018-11-07

## 2018-11-07 VITALS
WEIGHT: 201 LBS | HEIGHT: 69 IN | DIASTOLIC BLOOD PRESSURE: 78 MMHG | OXYGEN SATURATION: 98 % | BODY MASS INDEX: 29.77 KG/M2 | SYSTOLIC BLOOD PRESSURE: 122 MMHG | HEART RATE: 58 BPM

## 2018-11-07 DIAGNOSIS — G47.30 SLEEP APNEA, UNSPECIFIED TYPE: ICD-10-CM

## 2018-11-07 DIAGNOSIS — Z23 NEED FOR VACCINATION: ICD-10-CM

## 2018-11-07 DIAGNOSIS — I10 ESSENTIAL HYPERTENSION: Primary | ICD-10-CM

## 2018-11-07 PROCEDURE — 99213 OFFICE O/P EST LOW 20 MIN: CPT | Performed by: NURSE PRACTITIONER

## 2018-11-07 PROCEDURE — 90750 HZV VACC RECOMBINANT IM: CPT | Performed by: NURSE PRACTITIONER

## 2018-11-07 PROCEDURE — 90471 IMMUNIZATION ADMIN: CPT | Performed by: NURSE PRACTITIONER

## 2018-11-07 NOTE — PROGRESS NOTES
Shon Sheikh is a 58 y.o. male.Shon has right ear discomfort that has been present for a few weeks. It started when he tried to wear an OTC device for snoring, he has sleep apnea    Secondly Shon is following up on hypertension.     Assessment/Plan   Problem List Items Addressed This Visit     None             No Follow-up on file.  There are no Patient Instructions on file for this visit.    No chief complaint on file.    Social History   Substance Use Topics   • Smoking status: Never Smoker   • Smokeless tobacco: Never Used   • Alcohol use 1.8 oz/week     1 Glasses of wine, 1 Cans of beer, 1 Shots of liquor per week       History of Present Illness     The following portions of the patient's history were reviewed and updated as appropriate:PMHroutine: Social history , Allergies, Current Medications, Active Problem List and Health Maintenance    Review of Systems   HENT: Positive for ear pain. Negative for ear discharge, hearing loss, rhinorrhea and sore throat.    Respiratory: Negative for cough.    Gastrointestinal: Negative for abdominal pain, diarrhea and vomiting.   Musculoskeletal: Negative for neck pain.   Skin: Negative for rash.   Neurological: Negative for headaches.       Objective   Vitals:    11/07/18 1318   Weight: 91.2 kg (201 lb)     Body mass index is 29.68 kg/m².  Physical Exam   Constitutional: He appears well-developed and well-nourished. No distress.   HENT:   Head: Normocephalic and atraumatic.   Right Ear: External ear normal.   Left Ear: External ear normal.   Mouth/Throat: Oropharynx is clear and moist.   Eyes: EOM are normal.   Cardiovascular: Normal rate and regular rhythm.    Pulmonary/Chest: Effort normal and breath sounds normal.   Musculoskeletal: Normal range of motion.   Neurological: He is alert.   Skin: Skin is warm.   Nursing note and vitals reviewed.    Reviewed Data:  No visits with results within 1 Month(s) from this visit.   Latest known visit with results is:   Orders  Only on 12/18/2017   Component Date Value Ref Range Status   • Glucose 02/19/2018 90  65 - 99 mg/dL Final   • BUN 02/19/2018 26* 6 - 20 mg/dL Final   • Creatinine 02/19/2018 1.35* 0.76 - 1.27 mg/dL Final   • eGFR Non  Am 02/19/2018 55* >60 mL/min/1.73 Final   • eGFR African Am 02/19/2018 66  >60 mL/min/1.73 Final   • BUN/Creatinine Ratio 02/19/2018 19.3  7.0 - 25.0 Final   • Sodium 02/19/2018 144  136 - 145 mmol/L Final   • Potassium 02/19/2018 4.0  3.5 - 5.2 mmol/L Final   • Chloride 02/19/2018 100  98 - 107 mmol/L Final   • Total CO2 02/19/2018 30.7* 22.0 - 29.0 mmol/L Final   • Calcium 02/19/2018 9.6  8.6 - 10.5 mg/dL Final   • Total Protein 02/19/2018 6.8  6.0 - 8.5 g/dL Final   • Albumin 02/19/2018 4.40  3.50 - 5.20 g/dL Final   • Globulin 02/19/2018 2.4  gm/dL Final   • A/G Ratio 02/19/2018 1.8  g/dL Final   • Total Bilirubin 02/19/2018 0.5  0.1 - 1.2 mg/dL Final   • Alkaline Phosphatase 02/19/2018 57  39 - 117 U/L Final   • AST (SGOT) 02/19/2018 28  1 - 40 U/L Final   • ALT (SGPT) 02/19/2018 25  1 - 41 U/L Final   • Please note 02/19/2018 Comment   Final    Comment: We have received your request for additional testing or test  verification.  You will be notified if we are unable to process  your request.     • Specimen Status 02/19/2018 Comment   Final    Comment: Written Authorization  Written Authorization  Written Authorization Received.  Authorization received from WRITTEN REQUEST 02-  Logged by Kevyn Murillo     • Total Cholesterol 02/19/2018 233* 0 - 200 mg/dL Final   • Triglycerides 02/19/2018 110  0 - 150 mg/dL Final   • HDL Cholesterol 02/19/2018 47  40 - 60 mg/dL Final   • VLDL Cholesterol 02/19/2018 22  5 - 40 mg/dL Final   • LDL Cholesterol  02/19/2018 164* 0 - 100 mg/dL Final   Wants to try 5 mgs of amlodipine and check his BP at home and see how it goes

## 2018-12-18 DIAGNOSIS — I10 ESSENTIAL HYPERTENSION: ICD-10-CM

## 2018-12-18 RX ORDER — AMLODIPINE BESYLATE 10 MG/1
10 TABLET ORAL DAILY
Qty: 90 TABLET | Refills: 1 | Status: SHIPPED | OUTPATIENT
Start: 2018-12-18 | End: 2020-01-10 | Stop reason: ALTCHOICE

## 2018-12-21 ENCOUNTER — OFFICE VISIT (OUTPATIENT)
Dept: SLEEP MEDICINE | Facility: HOSPITAL | Age: 58
End: 2018-12-21
Attending: INTERNAL MEDICINE

## 2018-12-21 VITALS
DIASTOLIC BLOOD PRESSURE: 73 MMHG | HEIGHT: 69 IN | WEIGHT: 195 LBS | BODY MASS INDEX: 28.88 KG/M2 | SYSTOLIC BLOOD PRESSURE: 140 MMHG | HEART RATE: 57 BPM

## 2018-12-21 DIAGNOSIS — G47.30 SLEEP APNEA, UNSPECIFIED TYPE: ICD-10-CM

## 2018-12-21 DIAGNOSIS — E66.3 OVERWEIGHT (BMI 25.0-29.9): ICD-10-CM

## 2018-12-21 DIAGNOSIS — R06.83 SNORING: Primary | ICD-10-CM

## 2018-12-21 PROCEDURE — G0463 HOSPITAL OUTPT CLINIC VISIT: HCPCS

## 2018-12-21 NOTE — PROGRESS NOTES
"[facility] SLEEP MEDICINE  [address] [facility]  3rd Floor  [facility] [address]  [phone]    Referring Physician: CARLO Iqbal  PCP: Merry Welsh APRN    Reason for consult:  Sleep disorders of Snoring    Shon Sheikh is a 58 y.o.male was seen in the Sleep Disorders Center today. He has excessive snoring. Sleeps from 11p to 6:30a. He wakes up usually rested. Some witnessed apneas. Denies EDS. Frequent awakenings, some for restroom.  [place] Sleepiness Score: 7. Caffeine intake 2 per day. Alcohol intake 3-4 per week.    Shon Sheikh  has a past medical history of Hypertension. BPH    Current Medications:    Current Outpatient Medications:   •  amLODIPine (NORVASC) 10 MG tablet, TAKE 1 TABLET BY MOUTH DAILY., Disp: 90 tablet, Rfl: 1  •  aspirin 81 MG EC tablet, Take 81 mg by mouth Daily., Disp: , Rfl:   •  Cholecalciferol (VITAMIN D PO), Take  by mouth., Disp: , Rfl:   •  fluticasone (FLONASE) 50 MCG/ACT nasal spray, INSTILL 2 SPRAYS IN EACH NOSTRIL EVERY DAY, Disp: , Rfl: 6  •  metoprolol succinate XL (TOPROL-XL) 100 MG 24 hr tablet, TAKE ONE TABLET BY MOUTH DAILY, Disp: 90 tablet, Rfl: 0  •  Multiple Vitamin (MULTI VITAMIN MENS PO), Take  by mouth., Disp: , Rfl:   •  tamsulosin (FLOMAX) 0.4 MG capsule 24 hr capsule, TAKE 1 CAPSULE BY MOUTH EVERY NIGHT., Disp: 90 capsule, Rfl: 0   also listed in Sleep Questionnaire.    FH: family history includes No Known Problems in his mother; Pancreatic cancer in his father.  SH:  reports that  has never smoked. he has never used smokeless tobacco. He reports that he drinks about 1.8 oz of alcohol per week. He reports that he does not use drugs.    Pertinent Positive Review of Systems of frequent urination, mild ankle swelling  Rest of Review of Systems was negative as recorded in Sleep Questionnaire.        Vital Signs: /73   Pulse 57   Ht 175.3 cm (69\")   Wt 88.5 kg (195 lb)   BMI 28.80 kg/m²     Body mass index is 28.8 kg/m².       " Tongue: normal      Dentition: good       Pharynx: Posterior pharyngeal pillars are narrow   Mallampatti: IV (only hard palate visible)        General: Alert. Cooperative. Well developed. No acute distress.             Head:  Normocephalic. Symmetrical. Atraumatic.              Eyes: Sclera clear. No icterus. PERRLA. Normal EOM.             Ears: No deformities. Normal hearing.             Nose: No septal deviation. No drainage.          Throat: No oral lesions. No thrush. Moist mucous membranes.    Chest Wall:  Normal shape. Symmetric expansion with respiration. No tenderness.             Neck:  Trachea midline.           Lungs:  Clear to auscultation bilaterally. No wheezes. No rhonchi. No rales. Respirations regular, even and unlabored.            Heart:  Regular rhythm and normal rate. Normal S1 and S2. No murmur.     Abdomen:  Soft, non-tender and non-distended. Normal bowel sounds. No masses.  Extremities:  Moves all extremities well. No edema.           Pulses: Pulses palpable and equal bilaterally.               Skin: Dry. Intact. No bleeding. No rash.           Neuro: Moves all 4 extremities and cranial nerves grossly intact.  Psychiatric: Normal mood and affect.    Impression:  1. Snoring    2. Overweight (BMI 25.0-29.9)    3. Sleep apnea, unspecified type        Plan:  Shon has mostly only snoring.  He requires further evaluation with HST.  I explained to him that snoring as a cosmetic problem.  However it can indicate obstructive sleep apnea which is indeed a medical issue which requires treatment.  Mild sleep apnea may be treatable with a oral appliance.  More severe sleep apnea and requires a CPAP device.  I will see him back in this office after completion of study to discuss results.    This patient has typical features of obstructive sleep apnea with hypersomnolence snoring and apneas along with elevated BMI and classic oropharyngeal structure.  Likelihood of obstructive sleep apnea is high and a  polysomnogram study will therefore be requested.      Possible diagnosis and pathophysiology of obstructive sleep apnea was discussed with the patient.  Health risks of untreated obstructive sleep apnea including cardiovascular risks were discussed.  Patient was cautioned about activities requiring full concentration especially driving at night or for longer distances, and until hypersomnolence is corrected.    Results of sleep testing will be reviewed to see if patient is a candidate for CPAP machine.  Alternatives to therapy include oral mandibular advancing device (OMAD) were discussed. However OMAD is usually only beneficial in mild obstructive sleep apnea.  The benefit of weight loss in reducing severity of obstructive sleep apnea was discussed.  Patient would benefit from adhering to a strict diet to achieve ideal BMI.     Patient will follow up in this clinic after testing.    Thank you for allowing me to participate in your patient's care.    Jose Armando Norton MD    Part of this note may be an electronic transcription/translation of spoken language to printed text using the Dragon Dictation System.

## 2019-01-13 DIAGNOSIS — I10 ESSENTIAL HYPERTENSION: ICD-10-CM

## 2019-01-14 ENCOUNTER — TELEPHONE (OUTPATIENT)
Dept: FAMILY MEDICINE CLINIC | Facility: CLINIC | Age: 59
End: 2019-01-14

## 2019-01-14 RX ORDER — METOPROLOL SUCCINATE 100 MG/1
TABLET, EXTENDED RELEASE ORAL
Qty: 90 TABLET | Refills: 0 | Status: SHIPPED | OUTPATIENT
Start: 2019-01-14 | End: 2019-04-14 | Stop reason: SDUPTHER

## 2019-01-14 NOTE — TELEPHONE ENCOUNTER
Patients spouse called and left a , for an appointment, please call her to schedule something. She mentioned getting a referral, we would need to see the patient in order to refer.

## 2019-01-21 ENCOUNTER — OFFICE VISIT (OUTPATIENT)
Dept: FAMILY MEDICINE CLINIC | Facility: CLINIC | Age: 59
End: 2019-01-21

## 2019-01-21 VITALS
RESPIRATION RATE: 16 BRPM | HEART RATE: 54 BPM | DIASTOLIC BLOOD PRESSURE: 64 MMHG | OXYGEN SATURATION: 99 % | SYSTOLIC BLOOD PRESSURE: 102 MMHG | HEIGHT: 69 IN | BODY MASS INDEX: 29.47 KG/M2 | WEIGHT: 199 LBS

## 2019-01-21 DIAGNOSIS — D22.9 ATYPICAL MOLE: Primary | ICD-10-CM

## 2019-01-21 PROCEDURE — 99213 OFFICE O/P EST LOW 20 MIN: CPT | Performed by: NURSE PRACTITIONER

## 2019-01-21 NOTE — PROGRESS NOTES
"Shon Sheikh is a 58 y.o. male.      Assessment/Plan   Problem List Items Addressed This Visit     None      Visit Diagnoses     Atypical mole    -  Primary    Relevant Orders    Ambulatory Referral to Dermatology             No Follow-up on file.  There are no Patient Instructions on file for this visit.    Chief Complaint   Patient presents with   • Spot on top of head     Social History     Tobacco Use   • Smoking status: Never Smoker   • Smokeless tobacco: Never Used   Substance Use Topics   • Alcohol use: Yes     Alcohol/week: 1.8 oz     Types: 1 Glasses of wine, 1 Cans of beer, 1 Shots of liquor per week   • Drug use: No       History of Present Illness     The following portions of the patient's history were reviewed and updated as appropriate:PMHroutine: Social history , Allergies, Current Medications, Active Problem List and Health Maintenance    Review of Systems   Constitutional: Negative for activity change, appetite change, chills, fatigue, fever and unexpected weight change.   Eyes: Negative for visual disturbance.   Respiratory: Negative for cough and shortness of breath.    Skin: Negative for rash and wound.   Neurological: Negative for dizziness and weakness.   Hematological: Does not bruise/bleed easily.       Objective   Vitals:    01/21/19 0738   BP: 102/64   Pulse: 54   Resp: 16   SpO2: 99%   Weight: 90.3 kg (199 lb)   Height: 175.3 cm (69\")     Body mass index is 29.39 kg/m².  Physical Exam   Constitutional: He appears well-developed and well-nourished. No distress.   HENT:   Head: Normocephalic and atraumatic.   Pulmonary/Chest: Effort normal.   Neurological: He is alert.   Skin:   Beige raised mole to top of head   Nursing note and vitals reviewed.    Reviewed Data:  No visits with results within 1 Month(s) from this visit.   Latest known visit with results is:   Orders Only on 12/18/2017   Component Date Value Ref Range Status   • Glucose 02/19/2018 90  65 - 99 mg/dL Final   • BUN " 02/19/2018 26* 6 - 20 mg/dL Final   • Creatinine 02/19/2018 1.35* 0.76 - 1.27 mg/dL Final   • eGFR Non  Am 02/19/2018 55* >60 mL/min/1.73 Final   • eGFR African Am 02/19/2018 66  >60 mL/min/1.73 Final   • BUN/Creatinine Ratio 02/19/2018 19.3  7.0 - 25.0 Final   • Sodium 02/19/2018 144  136 - 145 mmol/L Final   • Potassium 02/19/2018 4.0  3.5 - 5.2 mmol/L Final   • Chloride 02/19/2018 100  98 - 107 mmol/L Final   • Total CO2 02/19/2018 30.7* 22.0 - 29.0 mmol/L Final   • Calcium 02/19/2018 9.6  8.6 - 10.5 mg/dL Final   • Total Protein 02/19/2018 6.8  6.0 - 8.5 g/dL Final   • Albumin 02/19/2018 4.40  3.50 - 5.20 g/dL Final   • Globulin 02/19/2018 2.4  gm/dL Final   • A/G Ratio 02/19/2018 1.8  g/dL Final   • Total Bilirubin 02/19/2018 0.5  0.1 - 1.2 mg/dL Final   • Alkaline Phosphatase 02/19/2018 57  39 - 117 U/L Final   • AST (SGOT) 02/19/2018 28  1 - 40 U/L Final   • ALT (SGPT) 02/19/2018 25  1 - 41 U/L Final   • Please note 02/19/2018 Comment   Final    Comment: We have received your request for additional testing or test  verification.  You will be notified if we are unable to process  your request.     • Specimen Status 02/19/2018 Comment   Final    Comment: Written Authorization  Written Authorization  Written Authorization Received.  Authorization received from WRITTEN REQUEST 02-  Logged by Kevyn Murillo     • Total Cholesterol 02/19/2018 233* 0 - 200 mg/dL Final   • Triglycerides 02/19/2018 110  0 - 150 mg/dL Final   • HDL Cholesterol 02/19/2018 47  40 - 60 mg/dL Final   • VLDL Cholesterol 02/19/2018 22  5 - 40 mg/dL Final   • LDL Cholesterol  02/19/2018 164* 0 - 100 mg/dL Final

## 2019-01-28 ENCOUNTER — APPOINTMENT (OUTPATIENT)
Dept: SLEEP MEDICINE | Facility: HOSPITAL | Age: 59
End: 2019-01-28
Attending: INTERNAL MEDICINE

## 2019-02-01 ENCOUNTER — HOSPITAL ENCOUNTER (OUTPATIENT)
Dept: SLEEP MEDICINE | Facility: HOSPITAL | Age: 59
Discharge: HOME | End: 2019-02-01
Attending: INTERNAL MEDICINE | Admitting: INTERNAL MEDICINE

## 2019-02-01 DIAGNOSIS — G47.30 SLEEP APNEA, UNSPECIFIED TYPE: ICD-10-CM

## 2019-02-01 PROCEDURE — 95806 SLEEP STUDY UNATT&RESP EFFT: CPT

## 2019-02-04 ENCOUNTER — CLINICAL SUPPORT (OUTPATIENT)
Dept: FAMILY MEDICINE CLINIC | Facility: CLINIC | Age: 59
End: 2019-02-04

## 2019-02-04 DIAGNOSIS — Z23 NEED FOR VACCINATION: Primary | ICD-10-CM

## 2019-02-04 PROCEDURE — 90750 HZV VACC RECOMBINANT IM: CPT | Performed by: NURSE PRACTITIONER

## 2019-02-04 PROCEDURE — 90471 IMMUNIZATION ADMIN: CPT | Performed by: NURSE PRACTITIONER

## 2019-02-08 ENCOUNTER — TELEPHONE (OUTPATIENT)
Dept: SLEEP MEDICINE | Facility: HOSPITAL | Age: 59
End: 2019-02-08

## 2019-02-08 NOTE — TELEPHONE ENCOUNTER
Spoke with patient about sleep study results, sending orders to [facility], follow up scheduled 4/12/19

## 2019-04-12 ENCOUNTER — OFFICE VISIT (OUTPATIENT)
Dept: SLEEP MEDICINE | Facility: HOSPITAL | Age: 59
End: 2019-04-12

## 2019-04-12 VITALS
HEART RATE: 50 BPM | OXYGEN SATURATION: 96 % | BODY MASS INDEX: 29.47 KG/M2 | WEIGHT: 199 LBS | DIASTOLIC BLOOD PRESSURE: 78 MMHG | SYSTOLIC BLOOD PRESSURE: 114 MMHG | HEIGHT: 69 IN

## 2019-04-12 DIAGNOSIS — G47.33 OBSTRUCTIVE SLEEP APNEA: Primary | ICD-10-CM

## 2019-04-12 PROCEDURE — G0463 HOSPITAL OUTPT CLINIC VISIT: HCPCS

## 2019-04-12 NOTE — PROGRESS NOTES
"[facility] SLEEP MEDICINE  [address]  3rd Floor  [facility] [address]  [phone]    PCP: Merry Welsh APRN    Reason for visit:  Sleep disorders: SARAH    Shon is a 58 y.o.male who was seen in the Sleep Disorders Center today. He was setup with CPAP and doing well. Using nasal cushions. No dry mouth. Wakes up more rested. Sleeps from 11p to 6:30a. He denies EDS.  [place] Sleepiness Scale is 4. Caffeine 2 per day. Alcohol 5 per week.    Shon  reports that he has never smoked. He has never used smokeless tobacco.    Pertinent Positive Review of Systems of denies  Rest of Review of Systems was negative as recorded in Sleep Questionnaire.    Patient  has a past medical history of Hypertension.     Current Medications:    Current Outpatient Medications:   •  amLODIPine (NORVASC) 10 MG tablet, TAKE 1 TABLET BY MOUTH DAILY., Disp: 90 tablet, Rfl: 1  •  aspirin 81 MG EC tablet, Take 81 mg by mouth Daily., Disp: , Rfl:   •  Cholecalciferol (VITAMIN D PO), Take  by mouth., Disp: , Rfl:   •  fluticasone (FLONASE) 50 MCG/ACT nasal spray, INSTILL 2 SPRAYS IN EACH NOSTRIL EVERY DAY, Disp: , Rfl: 6  •  metoprolol succinate XL (TOPROL-XL) 100 MG 24 hr tablet, TAKE ONE TABLET BY MOUTH DAILY, Disp: 90 tablet, Rfl: 0  •  Multiple Vitamin (MULTI VITAMIN MENS PO), Take  by mouth., Disp: , Rfl:   •  tamsulosin (FLOMAX) 0.4 MG capsule 24 hr capsule, TAKE 1 CAPSULE BY MOUTH EVERY NIGHT., Disp: 90 capsule, Rfl: 0   also entered in Sleep Questionnaire         Vital Signs: /78   Pulse 50   Ht 175.3 cm (69\")   Wt 90.3 kg (199 lb)   SpO2 96%   BMI 29.39 kg/m²     Body mass index is 29.39 kg/m².       Tongue: large      Dentition: good       Pharynx: Posterior pharyngeal pillars are wide   Mallampatti: III (soft and hard palate and base of uvula visible)        General: Alert. Cooperative. Well developed. No acute distress.             Head:  Normocephalic. Symmetrical. Atraumatic.              Nose: No " septal deviation. No drainage.          Throat: No oral lesions. No thrush. Moist mucous membranes.    Chest Wall:  Normal shape. Symmetric expansion with respiration. No tenderness.             Neck:  Trachea midline.           Lungs:  Clear to auscultation bilaterally. No wheezes. No rhonchi. No rales. Respirations regular, even and unlabored.            Heart:  Regular rhythm and normal rate. Normal S1 and S2. No murmur.     Abdomen:  Soft, non-tender and non-distended. Normal bowel sounds. No masses.  Extremities:  Moves all extremities well. No edema.    Psychiatric: Normal mood and affect.    Study:  · 2/4/19  The patient tolerated the home sleep testing with monitoring time of 387 minutes. The data obtained make this a technically adequate study. The apnea hypopneas index(AHI) was 18.7 per sleep hour.  The AHI during supine position was 32.2 per sleep hour. Mean heart rate of 65.8 BPM.  Snoring was noted 85.3% of sleep time. Lowest oxygen saturation during the study was 76%. Saturation below 89% was noted for 32.7 mins.     Testing:  · Compliance is excellent since set up with average usage 6 hours 54 minutes.  AHI 2.2 with average pressure 11 cm.  Auto CPAP set between 5 and 15 cm.    [facility]: [facility]    Impression:  1. Obstructive sleep apnea        Plan:  Shon is compliant and clearly benefits from use of the machine.  Wakes up more rested and refreshed.  His symptoms are much improved.  He has no problems with the mask or the pressures.  He was reminded to change his supplies regularly.    I reiterated the importance of effective treatment of obstructive sleep apnea with PAP machine.  Cardiovascular health risks of untreated sleep apnea were again reviewed.  Patient was asked to remain cautious if there is persistent hypersomnolence. The benefit of weight loss in reducing severity of obstructive sleep apnea was discussed.  Patient would benefit from adhering to a strict diet to achieve ideal  BMI.     Change of PAP supplies regularly is important for effective use.  Change of cushion on the mask or plugs on nasal pillows along with disposable filters once every month and change of mask frame, tubing, headgear and Velcro straps every 6 months at the minimum was reiterated.    This patient is compliant with PAP machine and benefits from its use.  Apnea hypopneas index is corrected/improved.  Daytime hypersomnolence has resolved.     Patient will follow up in this clinic in 6 months APRN.    Thank you for allowing me to participate in your patient's care.    Jose Armando Norton MD    Part of this note may be an electronic transcription/translation of spoken language to printed text using the Dragon Dictation System.

## 2019-04-14 DIAGNOSIS — I10 ESSENTIAL HYPERTENSION: ICD-10-CM

## 2019-04-15 RX ORDER — METOPROLOL SUCCINATE 100 MG/1
TABLET, EXTENDED RELEASE ORAL
Qty: 90 TABLET | Refills: 0 | Status: SHIPPED | OUTPATIENT
Start: 2019-04-15 | End: 2019-07-13 | Stop reason: SDUPTHER

## 2019-04-18 DIAGNOSIS — N40.1 BENIGN PROSTATIC HYPERPLASIA WITH URINARY HESITANCY: ICD-10-CM

## 2019-04-18 DIAGNOSIS — R39.11 BENIGN PROSTATIC HYPERPLASIA WITH URINARY HESITANCY: ICD-10-CM

## 2019-04-18 RX ORDER — TAMSULOSIN HYDROCHLORIDE 0.4 MG/1
1 CAPSULE ORAL NIGHTLY
Qty: 30 CAPSULE | Refills: 4 | Status: SHIPPED | OUTPATIENT
Start: 2019-04-18 | End: 2019-08-21 | Stop reason: SDUPTHER

## 2019-06-09 DIAGNOSIS — I10 ESSENTIAL HYPERTENSION: ICD-10-CM

## 2019-06-10 ENCOUNTER — TELEPHONE (OUTPATIENT)
Dept: FAMILY MEDICINE CLINIC | Facility: CLINIC | Age: 59
End: 2019-06-10

## 2019-06-10 RX ORDER — AMLODIPINE BESYLATE 10 MG/1
10 TABLET ORAL DAILY
Qty: 90 TABLET | Refills: 1 | OUTPATIENT
Start: 2019-06-10

## 2019-07-13 DIAGNOSIS — I10 ESSENTIAL HYPERTENSION: ICD-10-CM

## 2019-07-15 RX ORDER — METOPROLOL SUCCINATE 100 MG/1
TABLET, EXTENDED RELEASE ORAL
Qty: 90 TABLET | Refills: 0 | Status: SHIPPED | OUTPATIENT
Start: 2019-07-15 | End: 2019-10-10 | Stop reason: SDUPTHER

## 2019-08-21 DIAGNOSIS — R39.11 BENIGN PROSTATIC HYPERPLASIA WITH URINARY HESITANCY: ICD-10-CM

## 2019-08-21 DIAGNOSIS — N40.1 BENIGN PROSTATIC HYPERPLASIA WITH URINARY HESITANCY: ICD-10-CM

## 2019-08-21 RX ORDER — TAMSULOSIN HYDROCHLORIDE 0.4 MG/1
1 CAPSULE ORAL NIGHTLY
Qty: 90 CAPSULE | Refills: 0 | Status: SHIPPED | OUTPATIENT
Start: 2019-08-21 | End: 2019-10-14 | Stop reason: SDUPTHER

## 2019-10-10 DIAGNOSIS — I10 ESSENTIAL HYPERTENSION: ICD-10-CM

## 2019-10-10 RX ORDER — METOPROLOL SUCCINATE 100 MG/1
TABLET, EXTENDED RELEASE ORAL
Qty: 90 TABLET | Refills: 0 | Status: SHIPPED | OUTPATIENT
Start: 2019-10-10 | End: 2020-01-07

## 2019-10-14 DIAGNOSIS — R39.11 BENIGN PROSTATIC HYPERPLASIA WITH URINARY HESITANCY: ICD-10-CM

## 2019-10-14 DIAGNOSIS — N40.1 BENIGN PROSTATIC HYPERPLASIA WITH URINARY HESITANCY: ICD-10-CM

## 2019-10-14 RX ORDER — TAMSULOSIN HYDROCHLORIDE 0.4 MG/1
1 CAPSULE ORAL NIGHTLY
Qty: 90 CAPSULE | Refills: 0 | Status: SHIPPED | OUTPATIENT
Start: 2019-10-14 | End: 2019-10-15 | Stop reason: SDUPTHER

## 2019-10-15 DIAGNOSIS — N40.1 BENIGN PROSTATIC HYPERPLASIA WITH URINARY HESITANCY: ICD-10-CM

## 2019-10-15 DIAGNOSIS — R39.11 BENIGN PROSTATIC HYPERPLASIA WITH URINARY HESITANCY: ICD-10-CM

## 2019-10-15 RX ORDER — TAMSULOSIN HYDROCHLORIDE 0.4 MG/1
1 CAPSULE ORAL NIGHTLY
Qty: 90 CAPSULE | Refills: 1 | Status: SHIPPED | OUTPATIENT
Start: 2019-10-15 | End: 2020-06-10 | Stop reason: SDUPTHER

## 2019-10-18 ENCOUNTER — APPOINTMENT (OUTPATIENT)
Dept: SLEEP MEDICINE | Facility: HOSPITAL | Age: 59
End: 2019-10-18

## 2019-10-21 ENCOUNTER — OFFICE VISIT (OUTPATIENT)
Dept: SLEEP MEDICINE | Facility: HOSPITAL | Age: 59
End: 2019-10-21

## 2019-10-21 VITALS
OXYGEN SATURATION: 98 % | BODY MASS INDEX: 28.88 KG/M2 | DIASTOLIC BLOOD PRESSURE: 72 MMHG | HEIGHT: 69 IN | HEART RATE: 99 BPM | WEIGHT: 195 LBS | SYSTOLIC BLOOD PRESSURE: 135 MMHG

## 2019-10-21 DIAGNOSIS — G47.33 OBSTRUCTIVE SLEEP APNEA: Primary | ICD-10-CM

## 2019-10-21 PROCEDURE — G0463 HOSPITAL OUTPT CLINIC VISIT: HCPCS

## 2019-10-21 NOTE — PROGRESS NOTES
"[facility] Sleep Disorders Center  Telephone: [phone] / Fax: [phone] [place]  Telephone: [phone] / Fax: [phone] Duyen Casanova    PCP: Merry Welsh APRN    Reason for visit: SARAH f/u    Shon Sheikh is a 59 y.o.male  was last seen at [facility] sleep lab in April. He is doing very well on the CPAP 5-15cm. Current pressures appear to be adequate. Sleep quality has improved in comparison to prior treatment, and excessive sleepiness/snoring is no longer an issue.  There is no complaint of aerophagia, excessive dry mouth or air leak.  Sleep schedule is 10:30 PM-6 AM.  He wakes up feeling rested.    Social history, no tobacco, drinks 5 alcoholic drinks a week.    Review of System- negative    [facility]    Current Medications:    Current Outpatient Medications:   •  amLODIPine (NORVASC) 10 MG tablet, TAKE 1 TABLET BY MOUTH DAILY., Disp: 90 tablet, Rfl: 1  •  aspirin 81 MG EC tablet, Take 81 mg by mouth Daily., Disp: , Rfl:   •  Cholecalciferol (VITAMIN D PO), Take  by mouth., Disp: , Rfl:   •  fluticasone (FLONASE) 50 MCG/ACT nasal spray, INSTILL 2 SPRAYS IN EACH NOSTRIL EVERY DAY, Disp: , Rfl: 6  •  metoprolol succinate XL (TOPROL-XL) 100 MG 24 hr tablet, TAKE ONE TABLET BY MOUTH DAILY, Disp: 90 tablet, Rfl: 0  •  Multiple Vitamin (MULTI VITAMIN MENS PO), Take  by mouth., Disp: , Rfl:   •  tamsulosin (FLOMAX) 0.4 MG capsule 24 hr capsule, Take 1 capsule by mouth Every Night., Disp: 90 capsule, Rfl: 1   also entered in Sleep Questionnaire    Patient  has a past medical history of Hypertension.    I have reviewed the Past Medical History, Past Surgical History, Social History and Family History.    Vital Signs /72   Pulse 99   Ht 175.3 cm (69\")   Wt 88.5 kg (195 lb)   SpO2 98%   BMI 28.80 kg/m²  Body mass index is 28.8 kg/m².    General Alert and oriented. No acute distress noted   Pharynx/Throat Class IV Mallampati airway, large tongue, no evidence of redundant lateral pharyngeal " tissue. No oral lesions. No thrush. Moist mucous membranes.   Head Normocephalic. Symmetrical. Atraumatic.    Nose No septal deviation. No drainage   Chest Wall Normal shape. Symmetric expansion with respiration. No tenderness.   Neck Trachea midline, no thyromegaly or adenopathy    Lungs Clear to auscultation bilaterally. No wheezes. No rhonchi. No rales. Respirations regular, even and unlabored.   Heart Regular rhythm and normal rate. Normal S1 and S2. No murmur   Abdomen Soft, non-tender and non-distended. Normal bowel sounds. No masses.   Extremities Moves all extremities well. No edema   Psychiatric Normal mood and affect.     Testing:  Download July 23, 2019-October 20, 2019, average nightly use of 6 hours and 32 minutes on auto CPAP 5-15 cm, average pressure of 10.  Average AHI 1.4    Study:  · 2/4/19  The patient tolerated the home sleep testing with monitoring time of 387 minutes. The data obtained make this a technically adequate study. The apnea hypopneas index(AHI) was 18.7 per sleep hour.  The AHI during supine position was 32.2 per sleep hour. Mean heart rate of 65.8 BPM.  Snoring was noted 85.3% of sleep time. Lowest oxygen saturation during the study was 76%. Saturation below 89% was noted for 32.7 mins.     Impression:  1. Obstructive sleep apnea          Plan:  Patient uses the CPAP device and benefits from its use in terms of reduction of hypersomnia and snoring.  AHI appears to be within adequate range. I reviewed download report and original sleep study report with the patient.     Weight loss will be strongly beneficial to reduce the severity of sleep-disordered breathing.  Caution during activities that require prolonged concentration is strongly advised if sleepiness returns. Changing of PAP supplies regularly is important for effective use. Patient needs to change cushion on the mask or plugs on nasal pillows along with disposable filters once every month and change mask frame, tubing,  headgear and Velcro straps every 6 months at the minimum.       Follow up with Dr. Norton in one year    Thank you for allowing me to participate in your patient's care.      CARLO Soto  [facility]  Phone: [phone]      Part of this note may be an electronic transcription/translation of spoken language to printed text using the Dragon Dictation System.

## 2019-10-23 ENCOUNTER — FLU SHOT (OUTPATIENT)
Dept: FAMILY MEDICINE CLINIC | Facility: CLINIC | Age: 59
End: 2019-10-23

## 2019-10-23 DIAGNOSIS — Z23 NEED FOR INFLUENZA VACCINATION: ICD-10-CM

## 2019-10-23 PROCEDURE — 90471 IMMUNIZATION ADMIN: CPT | Performed by: NURSE PRACTITIONER

## 2019-10-23 PROCEDURE — 90686 IIV4 VACC NO PRSV 0.5 ML IM: CPT | Performed by: NURSE PRACTITIONER

## 2020-01-07 DIAGNOSIS — I10 ESSENTIAL HYPERTENSION: ICD-10-CM

## 2020-01-07 RX ORDER — METOPROLOL SUCCINATE 100 MG/1
TABLET, EXTENDED RELEASE ORAL
Qty: 90 TABLET | Refills: 0 | Status: SHIPPED | OUTPATIENT
Start: 2020-01-07 | End: 2020-04-02

## 2020-01-10 ENCOUNTER — OFFICE VISIT (OUTPATIENT)
Dept: FAMILY MEDICINE CLINIC | Facility: CLINIC | Age: 60
End: 2020-01-10

## 2020-01-10 VITALS
SYSTOLIC BLOOD PRESSURE: 112 MMHG | RESPIRATION RATE: 16 BRPM | OXYGEN SATURATION: 98 % | HEART RATE: 50 BPM | WEIGHT: 202.5 LBS | BODY MASS INDEX: 29.99 KG/M2 | HEIGHT: 69 IN | DIASTOLIC BLOOD PRESSURE: 70 MMHG

## 2020-01-10 DIAGNOSIS — M54.41 ACUTE RIGHT-SIDED LOW BACK PAIN WITH RIGHT-SIDED SCIATICA: Primary | ICD-10-CM

## 2020-01-10 PROCEDURE — 99213 OFFICE O/P EST LOW 20 MIN: CPT | Performed by: NURSE PRACTITIONER

## 2020-01-10 RX ORDER — PREDNISONE 20 MG/1
20 TABLET ORAL DAILY
Qty: 5 TABLET | Refills: 0 | Status: SHIPPED | OUTPATIENT
Start: 2020-01-10 | End: 2020-01-15

## 2020-01-10 RX ORDER — CYCLOBENZAPRINE HCL 10 MG
10 TABLET ORAL 3 TIMES DAILY PRN
Qty: 30 TABLET | Refills: 0 | Status: SHIPPED | OUTPATIENT
Start: 2020-01-10 | End: 2020-01-20

## 2020-01-10 NOTE — PROGRESS NOTES
Subjective   Shon Sheikh is a 59 y.o. male. He is a patient of CARLO Iqbal, but is new to me.     History of Present Illness   Patient here for right lower back pain that radiates down the outer right leg stopping at the ankle.  Patient admits numbness and tingling to right lower leg with weakness. He reports that his symptoms started about a week ago after working out with dumbbells. He's used icy hot patch. He describes pain as burning in his back and tightness in the lower leg. He reports that his pain level is 3/10.    The following portions of the patient's history were reviewed and updated as appropriate: allergies, current medications, past family history, past medical history, past social history, past surgical history and problem list.    Review of Systems   Constitutional: Negative for chills, fatigue and fever.   Respiratory: Negative for cough and shortness of breath.    Cardiovascular: Negative for chest pain, palpitations and leg swelling.   Musculoskeletal: Positive for back pain, joint swelling and myalgias. Negative for arthralgias, gait problem, neck pain and neck stiffness.   Skin: Negative for dry skin.   Neurological: Positive for weakness and numbness. Negative for dizziness and headache.        Tingling in right lower leg.    Psychiatric/Behavioral: Negative.        Objective   Physical Exam   Constitutional: He is oriented to person, place, and time. He appears well-developed and well-nourished.   HENT:   Head: Normocephalic and atraumatic.   Eyes: Pupils are equal, round, and reactive to light. Conjunctivae and EOM are normal.   Neck: Normal range of motion and full passive range of motion without pain. Neck supple. No thyromegaly present.   Cardiovascular: Normal rate, regular rhythm, normal heart sounds and intact distal pulses.   No murmur heard.  Pulmonary/Chest: Effort normal and breath sounds normal.   Musculoskeletal: Normal range of motion. He exhibits no edema or deformity.         Cervical back: Normal.        Thoracic back: Normal.        Lumbar back: He exhibits tenderness and pain. He exhibits normal range of motion, no swelling and no spasm.        Back:    Lymphadenopathy:     He has no cervical adenopathy.   Neurological: He is alert and oriented to person, place, and time.   Skin: Skin is warm and dry.   Psychiatric: He has a normal mood and affect. His behavior is normal. Judgment and thought content normal.   Nursing note and vitals reviewed.      Vitals:    01/10/20 1013   BP: 112/70   Pulse: 50   Resp: 16   SpO2: 98%     Body mass index is 29.9 kg/m².    Procedures    Assessment/Plan   Problems Addressed this Visit     None      Visit Diagnoses     Acute right-sided low back pain with right-sided sciatica    -  Primary    Relevant Medications    cyclobenzaprine (FLEXERIL) 10 MG tablet    predniSONE (DELTASONE) 20 MG tablet        Referral to physical therapy.    Return if symptoms worsen or fail to improve.          Patient Instructions   Follow-up as needed or if symptoms fail to improve.   May take prilosec with prednisone if any stomach irritation.

## 2020-01-10 NOTE — PATIENT INSTRUCTIONS
Follow-up as needed or if symptoms fail to improve.   May take prilosec with prednisone if any stomach irritation.

## 2020-04-02 DIAGNOSIS — I10 ESSENTIAL HYPERTENSION: ICD-10-CM

## 2020-04-02 RX ORDER — METOPROLOL SUCCINATE 100 MG/1
TABLET, EXTENDED RELEASE ORAL
Qty: 90 TABLET | Refills: 0 | Status: SHIPPED | OUTPATIENT
Start: 2020-04-02 | End: 2020-05-05 | Stop reason: SDUPTHER

## 2020-05-05 DIAGNOSIS — I10 ESSENTIAL HYPERTENSION: ICD-10-CM

## 2020-05-05 RX ORDER — METOPROLOL SUCCINATE 100 MG/1
100 TABLET, EXTENDED RELEASE ORAL DAILY
Qty: 90 TABLET | Refills: 0 | Status: SHIPPED | OUTPATIENT
Start: 2020-05-05 | End: 2020-06-10 | Stop reason: SDUPTHER

## 2020-05-28 ENCOUNTER — OFFICE VISIT (OUTPATIENT)
Dept: FAMILY MEDICINE CLINIC | Facility: CLINIC | Age: 60
End: 2020-05-28

## 2020-05-28 VITALS
DIASTOLIC BLOOD PRESSURE: 80 MMHG | WEIGHT: 191 LBS | TEMPERATURE: 96.6 F | SYSTOLIC BLOOD PRESSURE: 118 MMHG | OXYGEN SATURATION: 99 % | BODY MASS INDEX: 28.29 KG/M2 | HEIGHT: 69 IN | HEART RATE: 44 BPM | RESPIRATION RATE: 16 BRPM

## 2020-05-28 DIAGNOSIS — I10 ESSENTIAL HYPERTENSION: ICD-10-CM

## 2020-05-28 DIAGNOSIS — E55.9 VITAMIN D DEFICIENCY: ICD-10-CM

## 2020-05-28 DIAGNOSIS — N40.0 BENIGN PROSTATIC HYPERPLASIA, UNSPECIFIED WHETHER LOWER URINARY TRACT SYMPTOMS PRESENT: ICD-10-CM

## 2020-05-28 DIAGNOSIS — Z01.89 ROUTINE LAB DRAW: ICD-10-CM

## 2020-05-28 DIAGNOSIS — Z00.00 PHYSICAL EXAM: Primary | ICD-10-CM

## 2020-05-28 PROCEDURE — 99212 OFFICE O/P EST SF 10 MIN: CPT | Performed by: NURSE PRACTITIONER

## 2020-05-28 PROCEDURE — 99396 PREV VISIT EST AGE 40-64: CPT | Performed by: NURSE PRACTITIONER

## 2020-05-28 RX ORDER — METHYLPREDNISOLONE 4 MG/1
TABLET ORAL
Qty: 1 EACH | Refills: 0 | Status: SHIPPED | OUTPATIENT
Start: 2020-05-28 | End: 2021-06-28

## 2020-05-29 LAB
25(OH)D3+25(OH)D2 SERPL-MCNC: 34.9 NG/ML (ref 30–100)
ALBUMIN SERPL-MCNC: 4.8 G/DL (ref 3.5–5.2)
ALBUMIN/GLOB SERPL: 2.3 G/DL
ALP SERPL-CCNC: 48 U/L (ref 39–117)
ALT SERPL-CCNC: 20 U/L (ref 1–41)
AST SERPL-CCNC: 21 U/L (ref 1–40)
BILIRUB SERPL-MCNC: 0.5 MG/DL (ref 0.2–1.2)
BUN SERPL-MCNC: 23 MG/DL (ref 6–20)
BUN/CREAT SERPL: 17 (ref 7–25)
CALCIUM SERPL-MCNC: 9.9 MG/DL (ref 8.6–10.5)
CHLORIDE SERPL-SCNC: 102 MMOL/L (ref 98–107)
CHOLEST SERPL-MCNC: 202 MG/DL (ref 0–200)
CO2 SERPL-SCNC: 30 MMOL/L (ref 22–29)
CREAT SERPL-MCNC: 1.35 MG/DL (ref 0.76–1.27)
GLOBULIN SER CALC-MCNC: 2.1 GM/DL
GLUCOSE SERPL-MCNC: 90 MG/DL (ref 65–99)
HDLC SERPL-MCNC: 44 MG/DL (ref 40–60)
LDLC SERPL CALC-MCNC: 132 MG/DL (ref 0–100)
LDLC/HDLC SERPL: 3.01 {RATIO}
POTASSIUM SERPL-SCNC: 4.7 MMOL/L (ref 3.5–5.2)
PROT SERPL-MCNC: 6.9 G/DL (ref 6–8.5)
PSA SERPL-MCNC: 3.14 NG/ML (ref 0–4)
SODIUM SERPL-SCNC: 139 MMOL/L (ref 136–145)
TRIGL SERPL-MCNC: 128 MG/DL (ref 0–150)
VLDLC SERPL CALC-MCNC: 25.6 MG/DL (ref 5–40)

## 2020-06-10 ENCOUNTER — TELEPHONE (OUTPATIENT)
Dept: FAMILY MEDICINE CLINIC | Facility: CLINIC | Age: 60
End: 2020-06-10

## 2020-06-10 DIAGNOSIS — R39.11 BENIGN PROSTATIC HYPERPLASIA WITH URINARY HESITANCY: ICD-10-CM

## 2020-06-10 DIAGNOSIS — I10 ESSENTIAL HYPERTENSION: ICD-10-CM

## 2020-06-10 DIAGNOSIS — N40.1 BENIGN PROSTATIC HYPERPLASIA WITH URINARY HESITANCY: ICD-10-CM

## 2020-06-10 RX ORDER — METOPROLOL SUCCINATE 100 MG/1
100 TABLET, EXTENDED RELEASE ORAL DAILY
Qty: 90 TABLET | Refills: 0 | Status: SHIPPED | OUTPATIENT
Start: 2020-06-10 | End: 2020-06-10 | Stop reason: SDUPTHER

## 2020-06-10 RX ORDER — TAMSULOSIN HYDROCHLORIDE 0.4 MG/1
1 CAPSULE ORAL NIGHTLY
Qty: 90 CAPSULE | Refills: 1 | Status: SHIPPED | OUTPATIENT
Start: 2020-06-10 | End: 2020-06-10 | Stop reason: SDUPTHER

## 2020-06-10 RX ORDER — METOPROLOL SUCCINATE 100 MG/1
TABLET, EXTENDED RELEASE ORAL
Qty: 90 TABLET | Refills: 0 | OUTPATIENT
Start: 2020-06-10

## 2020-06-10 RX ORDER — TAMSULOSIN HYDROCHLORIDE 0.4 MG/1
1 CAPSULE ORAL NIGHTLY
Qty: 5 CAPSULE | Refills: 0 | Status: SHIPPED | OUTPATIENT
Start: 2020-06-10 | End: 2021-01-04

## 2020-06-10 RX ORDER — METOPROLOL SUCCINATE 100 MG/1
100 TABLET, EXTENDED RELEASE ORAL DAILY
Qty: 5 TABLET | Refills: 0 | Status: SHIPPED | OUTPATIENT
Start: 2020-06-10 | End: 2020-07-27

## 2020-06-10 NOTE — TELEPHONE ENCOUNTER
PATIENT STATES: that he is out of town and he left two of his meds at home they are metoprolol succinate XL (TOPROL-XL) 100 MG 24 hr tablet and tamsulosin (FLOMAX) 0.4 MG capsule 24 hr capsule. He was wanting 5 pill of each sent to [facility]/pharmacy #[id number] - [place], [place] - [address] [place] [address] AT [facility] [phone] [facility] [phone]      PATIENT CAN BE REACHED ON:[phone]

## 2020-07-27 DIAGNOSIS — I10 ESSENTIAL HYPERTENSION: ICD-10-CM

## 2020-07-27 RX ORDER — METOPROLOL SUCCINATE 100 MG/1
TABLET, EXTENDED RELEASE ORAL
Qty: 90 TABLET | Refills: 0 | Status: SHIPPED | OUTPATIENT
Start: 2020-07-27 | End: 2020-10-05

## 2020-09-08 ENCOUNTER — OFFICE VISIT (OUTPATIENT)
Dept: FAMILY MEDICINE CLINIC | Facility: CLINIC | Age: 60
End: 2020-09-08

## 2020-09-08 VITALS
OXYGEN SATURATION: 98 % | HEIGHT: 69 IN | HEART RATE: 50 BPM | RESPIRATION RATE: 15 BRPM | BODY MASS INDEX: 28.88 KG/M2 | SYSTOLIC BLOOD PRESSURE: 120 MMHG | TEMPERATURE: 96.9 F | DIASTOLIC BLOOD PRESSURE: 78 MMHG | WEIGHT: 195 LBS

## 2020-09-08 DIAGNOSIS — I10 ESSENTIAL HYPERTENSION: ICD-10-CM

## 2020-09-08 DIAGNOSIS — Z02.6 ENCOUNTER FOR EXAMINATION FOR INSURANCE PURPOSES: Primary | ICD-10-CM

## 2020-09-08 DIAGNOSIS — J30.1 SEASONAL ALLERGIC RHINITIS DUE TO POLLEN: ICD-10-CM

## 2020-09-08 PROCEDURE — 99213 OFFICE O/P EST LOW 20 MIN: CPT | Performed by: NURSE PRACTITIONER

## 2020-09-08 RX ORDER — FLUTICASONE PROPIONATE 50 MCG
2 SPRAY, SUSPENSION (ML) NASAL DAILY
Qty: 1 BOTTLE | Refills: 3 | Status: SHIPPED | OUTPATIENT
Start: 2020-09-08 | End: 2020-10-05

## 2020-09-08 NOTE — PROGRESS NOTES
Subjective   Shon Sheikh is a 60 y.o. male.   Allergies    History of Present Illness   Has been congested for over 2 months. He also complains of rhinnorhea. He takes otc coricedin and it seems to work.  Hypertension currently takes 100 mg of metoprolol and his blood pressure is currently stable.      The following portions of the patient's history were reviewed and updated as appropriate: allergies, current medications, past family history, past medical history, past social history, past surgical history and problem list.    Review of Systems   Constitutional: Negative for activity change, appetite change, chills and fever.   HENT: Positive for congestion. Negative for ear pain, sinus pressure and sore throat.    Eyes: Negative for pain.   Respiratory: Negative for cough and shortness of breath.    Cardiovascular: Negative for chest pain and leg swelling.   Gastrointestinal: Negative for nausea and vomiting.   Genitourinary: Negative for difficulty urinating.   Skin: Negative for rash.   Neurological: Negative for dizziness, facial asymmetry and headache.       Objective   Physical Exam   Constitutional: He is oriented to person, place, and time. He appears well-developed and well-nourished.   HENT:   Head: Normocephalic and atraumatic.   Right Ear: External ear normal.   Left Ear: External ear normal.   Mouth/Throat: Oropharynx is clear and moist.   Eyes: Pupils are equal, round, and reactive to light. Conjunctivae and EOM are normal.   Neck: Neck supple.   Cardiovascular: Normal rate and regular rhythm.   Pulmonary/Chest: Effort normal and breath sounds normal. No respiratory distress.   Abdominal: Bowel sounds are normal.   Musculoskeletal: Normal range of motion.   Lymphadenopathy:     He has no cervical adenopathy.   Neurological: He is alert and oriented to person, place, and time.   Skin: Skin is warm and dry.   Psychiatric: He has a normal mood and affect.   Nursing note and vitals  reviewed.        Assessment/Plan   Problem List Items Addressed This Visit        Cardiovascular and Mediastinum    Essential hypertension      Other Visit Diagnoses     Encounter for examination for insurance purposes    -  Primary    Relevant Orders    Nicotine & Metabolite, Quant    Seasonal allergic rhinitis due to pollen          continue current medications for hypertension    He is going to try some Afrin nasal spray at night for 3 nights and then he is going to start on some daily flonase.         No follow-ups on file.

## 2020-09-12 LAB
COTININE SERPL-MCNC: <1 NG/ML
NICOTINE SERPL-MCNC: <1 NG/ML

## 2020-09-14 ENCOUNTER — TELEPHONE (OUTPATIENT)
Dept: SLEEP MEDICINE | Facility: HOSPITAL | Age: 60
End: 2020-09-14

## 2020-09-18 ENCOUNTER — OFFICE VISIT (OUTPATIENT)
Dept: SLEEP MEDICINE | Facility: HOSPITAL | Age: 60
End: 2020-09-18

## 2020-09-18 VITALS
DIASTOLIC BLOOD PRESSURE: 75 MMHG | HEART RATE: 50 BPM | SYSTOLIC BLOOD PRESSURE: 136 MMHG | WEIGHT: 195 LBS | OXYGEN SATURATION: 98 % | BODY MASS INDEX: 28.88 KG/M2 | HEIGHT: 69 IN

## 2020-09-18 DIAGNOSIS — G47.33 OBSTRUCTIVE SLEEP APNEA: Primary | ICD-10-CM

## 2020-09-18 PROCEDURE — G0463 HOSPITAL OUTPT CLINIC VISIT: HCPCS

## 2020-09-18 NOTE — PROGRESS NOTES
"[facility] SLEEP MEDICINE  [address] [facility]  3RD FLOOR  [facility] [address]  [phone]    PCP: Merry Welsh APRN    Reason for visit:  Sleep disorders: SARAH    Shon is a 60 y.o.male who was seen in the Sleep Disorders Center today. Annual fu. He uses nasal pillows, no air leaks and no dry mouth. He sleeps from 11pm to 7:30am. He wakes up rested refreshed and no EDS.  [place] Sleepiness Scale is 6. Caffeine 2 per day. Alcohol 4-7 per week.    Shon  reports that he has never smoked. He has never used smokeless tobacco.    Pertinent Positive Review of Systems of nasal congestion  Rest of Review of Systems was negative as recorded in Sleep Questionnaire.    Patient  has a past medical history of BPH (benign prostatic hyperplasia), Hypertension, and Vitamin D deficiency.     Current Medications:    Current Outpatient Medications:   •  aspirin 81 MG EC tablet, Take 81 mg by mouth Daily., Disp: , Rfl:   •  Cholecalciferol (VITAMIN D PO), Take  by mouth., Disp: , Rfl:   •  fluticasone (Flonase) 50 MCG/ACT nasal spray, 2 sprays into the nostril(s) as directed by provider Daily., Disp: 1 bottle, Rfl: 3  •  methylPREDNISolone (MEDROL, ANTONELLA,) 4 MG tablet, Take as directed on package instructions., Disp: 1 each, Rfl: 0  •  metoprolol succinate XL (TOPROL-XL) 100 MG 24 hr tablet, TAKE 1 TABLET BY MOUTH EVERY DAY, Disp: 90 tablet, Rfl: 0  •  tamsulosin (FLOMAX) 0.4 MG capsule 24 hr capsule, Take 1 capsule by mouth Every Night., Disp: 5 capsule, Rfl: 0   also entered in Sleep Questionnaire         Vital Signs: /75   Pulse 50   Ht 175.3 cm (69\")   Wt 88.5 kg (195 lb)   SpO2 98%   BMI 28.80 kg/m²     Body mass index is 28.8 kg/m².       Tongue: Large       Dentition: good       Pharynx: Posterior pharyngeal pillars are wide   Mallampatti: II (hard and soft palate, upper portion of tonsils anduvula visible)        General: Alert. Cooperative. Well developed. No acute distress.             Head: "  Normocephalic. Symmetrical. Atraumatic.              Nose: No septal deviation. No drainage.          Throat: No oral lesions. No thrush. Moist mucous membranes.    Chest Wall:  Normal shape. Symmetric expansion with respiration. No tenderness.             Neck:  Trachea midline.           Lungs:  Clear to auscultation bilaterally. No wheezes. No rhonchi. No rales. Respirations regular, even and unlabored.            Heart:  Regular rhythm and normal rate. Normal S1 and S2. No murmur.     Abdomen:  Soft, non-tender and non-distended. Normal bowel sounds. No masses.  Extremities:  Moves all extremities well. No edema.    Psychiatric: Normal mood and affect.    Study:  · 2/4/19  The patient tolerated the home sleep testing with monitoring time of 387 minutes. The data obtained make this a technically adequate study. The apnea hypopneas index(AHI) was 18.7 per sleep hour.  The AHI during supine position was 32.2 per sleep hour. Mean heart rate of 65.8 BPM.  Snoring was noted 85.3% of sleep time. Lowest oxygen saturation during the study was 76%. Saturation below 89% was noted for 32.7 mins.     Testing:  · 100% compliance average usage 6-1/2 hours AHI 1.1 set pressure 5 to 15 cm with average 10 cm    [employer]: [employer]    Impression:  1. Obstructive sleep apnea        Plan:  Shon is compliant and benefits from the device.  He is having some trouble getting supplies and ill will look into another [employer] company.  Otherwise rested when he awakens and very compliant with the machine with no problems with usage.    I reiterated the importance of effective treatment of obstructive sleep apnea with PAP machine.  Cardiovascular health risks of untreated sleep apnea were again reviewed.  Patient was asked to remain cautious if there is persistent hypersomnolence. The benefit of weight loss in reducing severity of obstructive sleep apnea was discussed.  Patient would benefit from adhering to a strict diet to achieve ideal  BMI.     Change of PAP supplies regularly is important for effective use.  Change of cushion on the mask or plugs on nasal pillows along with disposable filters once every month and change of mask frame, tubing, headgear and Velcro straps every 6 months at the minimum was reiterated.    This patient is compliant with PAP machine and benefits from its use.  Apnea hypopneas index is corrected/improved.  Daytime hypersomnolence has resolved.     Patient will follow up in this clinic in 1 year APRN    Thank you for allowing me to participate in your patient's care.    Electronically signed by Jose Armando Norton MD, 09/18/20, 10:00 AM EDT.    Part of this note may be an electronic transcription/translation of spoken language to printed text using the Dragon Dictation System.

## 2020-10-04 DIAGNOSIS — I10 ESSENTIAL HYPERTENSION: ICD-10-CM

## 2020-10-05 RX ORDER — FLUTICASONE PROPIONATE 50 MCG
2 SPRAY, SUSPENSION (ML) NASAL DAILY
Qty: 48 ML | Refills: 1 | Status: SHIPPED | OUTPATIENT
Start: 2020-10-05 | End: 2023-01-27

## 2020-10-05 RX ORDER — METOPROLOL SUCCINATE 100 MG/1
TABLET, EXTENDED RELEASE ORAL
Qty: 90 TABLET | Refills: 1 | Status: SHIPPED | OUTPATIENT
Start: 2020-10-05 | End: 2021-04-05

## 2020-10-28 ENCOUNTER — FLU SHOT (OUTPATIENT)
Dept: FAMILY MEDICINE CLINIC | Facility: CLINIC | Age: 60
End: 2020-10-28

## 2020-10-28 DIAGNOSIS — Z23 NEED FOR INFLUENZA VACCINATION: ICD-10-CM

## 2020-10-28 PROCEDURE — 90471 IMMUNIZATION ADMIN: CPT | Performed by: NURSE PRACTITIONER

## 2020-10-28 PROCEDURE — 90686 IIV4 VACC NO PRSV 0.5 ML IM: CPT | Performed by: NURSE PRACTITIONER

## 2021-01-02 DIAGNOSIS — R39.11 BENIGN PROSTATIC HYPERPLASIA WITH URINARY HESITANCY: ICD-10-CM

## 2021-01-02 DIAGNOSIS — N40.1 BENIGN PROSTATIC HYPERPLASIA WITH URINARY HESITANCY: ICD-10-CM

## 2021-01-04 RX ORDER — TAMSULOSIN HYDROCHLORIDE 0.4 MG/1
CAPSULE ORAL
Qty: 90 CAPSULE | Refills: 1 | Status: SHIPPED | OUTPATIENT
Start: 2021-01-04 | End: 2021-06-01

## 2021-03-24 ENCOUNTER — BULK ORDERING (OUTPATIENT)
Dept: CASE MANAGEMENT | Facility: OTHER | Age: 61
End: 2021-03-24

## 2021-03-24 DIAGNOSIS — Z23 IMMUNIZATION DUE: ICD-10-CM

## 2021-03-30 DIAGNOSIS — Z12.11 COLON CANCER SCREENING: Primary | ICD-10-CM

## 2021-04-04 DIAGNOSIS — I10 ESSENTIAL HYPERTENSION: ICD-10-CM

## 2021-04-05 RX ORDER — METOPROLOL SUCCINATE 100 MG/1
100 TABLET, EXTENDED RELEASE ORAL DAILY
Qty: 90 TABLET | Refills: 0 | Status: SHIPPED | OUTPATIENT
Start: 2021-04-05 | End: 2021-08-16

## 2021-05-07 ENCOUNTER — LAB REQUISITION (OUTPATIENT)
Dept: LAB | Facility: HOSPITAL | Age: 61
End: 2021-05-07

## 2021-05-07 DIAGNOSIS — Z00.00 ENCOUNTER FOR GENERAL ADULT MEDICAL EXAMINATION WITHOUT ABNORMAL FINDINGS: ICD-10-CM

## 2021-05-07 PROCEDURE — U0004 COV-19 TEST NON-CDC HGH THRU: HCPCS | Performed by: INTERNAL MEDICINE

## 2021-05-08 LAB — SARS-COV-2 ORF1AB RESP QL NAA+PROBE: NOT DETECTED

## 2021-05-13 ENCOUNTER — AMBULATORY SURGICAL CENTER (OUTPATIENT)
Dept: URBAN - METROPOLITAN AREA SURGERY 20 | Facility: SURGERY | Age: 61
End: 2021-05-13
Payer: COMMERCIAL

## 2021-05-13 DIAGNOSIS — K64.1 SECOND DEGREE HEMORRHOIDS: ICD-10-CM

## 2021-05-13 DIAGNOSIS — Z12.11 ENCOUNTER FOR SCREENING FOR MALIGNANT NEOPLASM OF COLON: ICD-10-CM

## 2021-05-13 PROCEDURE — 45378 DIAGNOSTIC COLONOSCOPY: CPT | Mod: 33 | Performed by: INTERNAL MEDICINE

## 2021-05-28 DIAGNOSIS — R39.11 BENIGN PROSTATIC HYPERPLASIA WITH URINARY HESITANCY: ICD-10-CM

## 2021-05-28 DIAGNOSIS — N40.1 BENIGN PROSTATIC HYPERPLASIA WITH URINARY HESITANCY: ICD-10-CM

## 2021-06-01 RX ORDER — TAMSULOSIN HYDROCHLORIDE 0.4 MG/1
CAPSULE ORAL
Qty: 90 CAPSULE | Refills: 1 | Status: SHIPPED | OUTPATIENT
Start: 2021-06-01 | End: 2021-12-03

## 2021-06-28 ENCOUNTER — OFFICE VISIT (OUTPATIENT)
Dept: FAMILY MEDICINE CLINIC | Facility: CLINIC | Age: 61
End: 2021-06-28

## 2021-06-28 VITALS
SYSTOLIC BLOOD PRESSURE: 162 MMHG | HEART RATE: 43 BPM | DIASTOLIC BLOOD PRESSURE: 92 MMHG | RESPIRATION RATE: 14 BRPM | HEIGHT: 69 IN | OXYGEN SATURATION: 99 % | BODY MASS INDEX: 28.73 KG/M2 | WEIGHT: 194 LBS

## 2021-06-28 DIAGNOSIS — M77.01 EPICONDYLITIS ELBOW, MEDIAL, RIGHT: Primary | ICD-10-CM

## 2021-06-28 PROCEDURE — 99213 OFFICE O/P EST LOW 20 MIN: CPT | Performed by: NURSE PRACTITIONER

## 2021-06-28 RX ORDER — MELOXICAM 7.5 MG/1
7.5 TABLET ORAL DAILY
Qty: 30 TABLET | Refills: 1 | Status: SHIPPED | OUTPATIENT
Start: 2021-06-28 | End: 2021-06-29 | Stop reason: SDUPTHER

## 2021-06-28 NOTE — PROGRESS NOTES
Subjective   Shon Sheikh is a 60 y.o. male.   right elbow strain    History of Present Illness   Right elbow pain after moving stone.  He took some tylenol which helped a little.He is right hand dominant.    The following portions of the patient's history were reviewed and updated as appropriate: allergies, current medications, past family history, past medical history, past social history, past surgical history and problem list.    Review of Systems   Constitutional: Positive for activity change. Negative for appetite change, fatigue and fever.   Respiratory: Negative for cough.    Musculoskeletal: Positive for bursitis.       Objective   Physical Exam  Vitals and nursing note reviewed.   Constitutional:       General: He is not in acute distress.     Appearance: Normal appearance.   HENT:      Mouth/Throat:      Mouth: Mucous membranes are moist.   Cardiovascular:      Rate and Rhythm: Normal rate and regular rhythm.   Musculoskeletal:         General: Tenderness present. Normal range of motion.      Comments: Tenderness with swelling to the right medial condyle.  No pain with supination or pronation.  Has good flexion and extension.   Neurological:      General: No focal deficit present.      Mental Status: He is alert.           Assessment/Plan   Problem List Items Addressed This Visit     None      Visit Diagnoses     Epicondylitis elbow, medial, right    -  Primary        Ice is much as possible for pain relief.  Discussed going somewhere in getting a brace for his arm.  If no improvement in 4 to 6 weeks call the office.       Return if symptoms worsen or fail to improve.   Answers for HPI/ROS submitted by the patient on 6/25/2021  What is the primary reason for your visit?: Other  Please describe your symptoms.: Pain in right elbow after strenuous exercise  Have you had these symptoms before?: No  How long have you been having these symptoms?: Greater than 2 weeks  Please list any medications you are  currently taking for this condition.: ibuprofen  Please describe any probable cause for these symptoms. : Shoveling stone and pushing and dumping wheelbarrow

## 2021-06-28 NOTE — PATIENT INSTRUCTIONS
[facility] Elbow Rehab  Ask your health care provider which exercises are safe for you. Do exercises exactly as told by your health care provider and adjust them as directed. It is normal to feel mild stretching, pulling, tightness, or discomfort as you do these exercises. Stop right away if you feel sudden pain or your pain gets worse. Do not begin these exercises until told by your health care provider.  Stretching and range-of-motion exercises  These exercises warm up your muscles and joints and improve the movement and flexibility of your elbow.  Wrist extension, assisted    1. Straighten your left / right elbow in front of you with your palm facing up toward the ceiling.  ? If told by your health care provider, bend your left / right elbow to a 90-degree angle (right angle) at your side instead of holding it straight.  2. With your other hand, gently pull your left / right hand and fingers toward the floor (extension). Stop when you feel a gentle stretch on the palm side of your forearm.  3. Hold this position for __________ seconds.  Repeat __________ times. Complete this exercise __________ times a day.  Wrist flexion, assisted    1. Straighten your left / right elbow in front of you with your palm facing down toward the floor.  ? If told by your health care provider, bend your left / right elbow to a 90-degree angle (right angle) at your side instead of holding it straight.  2. With your other hand, gently push over the back of your left / right hand so your fingers point toward the floor (flexion). Stop when you feel a gentle stretch on the back of your forearm.  3. Hold this position for __________ seconds.  Repeat __________ times. Complete this exercise __________ times a day.  Assisted forearm rotation, supination  1. Sit or stand with your elbows at your side.  2. Bend your left / right elbow to a 90-degree angle (right angle).  3. Using your uninjured hand, turn your left / right palm up toward the  ceiling (supination) until you feel a gentle stretch along the inside of your forearm.  4. Hold this position for __________ seconds.  Repeat __________ times. Complete this exercise __________ times a day.  Assisted forearm rotation, pronation  1. Sit or stand with your elbows at your side.  2. Bend your left / right elbow to a 90-degree angle (right angle).  3. Using your uninjured hand, turn your left / right palm down toward the floor (pronation) until you feel a gentle stretch along the top of your forearm.  4. Hold this position for __________ seconds.  Repeat __________ times. Complete this exercise __________ times a day.  Strengthening exercises  These exercises build strength and endurance in your elbow. Endurance is the ability to use your muscles for a long time, even after they get tired.  Wrist flexion    1. Sit with your left / right forearm supported on a table or other surface and your palm turned up toward the ceiling. Let your left / right wrist extend over the edge of the surface.  2. Hold a __________ weight or a piece of rubber exercise band or tubing.  ? If using a rubber exercise band or tubing, hold the other end of the tubing with your other hand.  3. Slowly bend your wrist so your hand moves up toward the ceiling (flexion). Try to only move your wrist and keep the rest of your arm still.  4. Hold this position for __________ seconds.  5. Slowly return to the starting position.  Repeat __________ times. Complete this exercise __________ times a day.  Wrist flexion, eccentric  1. Sit with your left / right forearm palm-up and supported on a table or other surface. Let your left / right wrist extend over the edge of the surface.  2. Hold a __________ weight or a piece of rubber exercise band or tubing in your left / right hand.  ? If using a rubber exercise band or tubing, hold the other end of the tubing with your other hand.  3. Use your uninjured hand to move your left / right hand up  toward the ceiling.  4. Take your uninjured hand away and slowly return to the starting position using only your left / right hand (eccentric flexion).  Repeat __________ times. Complete this exercise __________ times a day.  Forearm rotation, pronation  To do this exercise, you will need a lightweight hammer or rubber mallet.  1. Sit with your left / right forearm supported on a table or other surface. Bend your elbow to a 90-degree angle (right angle). Position your forearm so that your palm is facing up toward the ceiling, with your hand resting over the edge of the table.  2. Hold a hammer in your left / right hand.  ? To make this exercise easier, hold the hammer near the head of the hammer.  ? To make this exercise harder, hold the hammer near the end of the handle.  3. Without moving your elbow, slowly turn (rotate) your forearm so your palm faces down toward the floor (pronation).  4. Hold this position for __________ seconds.  5. Slowly return to the starting position.  Repeat __________ times. Complete this exercise __________ times a day.  Shoulder blade squeeze  1. Sit in a stable chair or stand with good posture. If you are sitting down, do not let your back touch the back of the chair.  2. Your arms should be at your sides with your elbows bent to a 90-degree angle (right angle). Position your forearms so that your thumbs are facing the ceiling (neutral position).  3. Without lifting your shoulders up, squeeze your shoulder blades tightly together.  4. Hold this position for __________ seconds.  5. Slowly release and return to the starting position.  Repeat __________ times. Complete this exercise __________ times a day.  This information is not intended to replace advice given to you by your health care provider. Make sure you discuss any questions you have with your health care provider.  Document Revised: 03/10/2021 Document Reviewed: 03/10/2021  Elsevier Patient Education © 2021 [employer]

## 2021-06-29 RX ORDER — MELOXICAM 7.5 MG/1
7.5 TABLET ORAL DAILY
Qty: 30 TABLET | Refills: 1 | Status: SHIPPED | OUTPATIENT
Start: 2021-06-29 | End: 2021-08-16

## 2021-07-29 ENCOUNTER — OFFICE VISIT (OUTPATIENT)
Dept: FAMILY MEDICINE CLINIC | Facility: CLINIC | Age: 61
End: 2021-07-29

## 2021-07-29 VITALS
DIASTOLIC BLOOD PRESSURE: 92 MMHG | HEIGHT: 69 IN | OXYGEN SATURATION: 99 % | HEART RATE: 46 BPM | SYSTOLIC BLOOD PRESSURE: 144 MMHG | RESPIRATION RATE: 14 BRPM | BODY MASS INDEX: 28.14 KG/M2 | WEIGHT: 190 LBS

## 2021-07-29 DIAGNOSIS — M77.01 EPICONDYLITIS ELBOW, MEDIAL, RIGHT: ICD-10-CM

## 2021-07-29 DIAGNOSIS — I10 ESSENTIAL HYPERTENSION: Primary | ICD-10-CM

## 2021-07-29 PROCEDURE — 99213 OFFICE O/P EST LOW 20 MIN: CPT | Performed by: NURSE PRACTITIONER

## 2021-07-29 RX ORDER — OLMESARTAN MEDOXOMIL 20 MG/1
20 TABLET ORAL DAILY
Qty: 30 TABLET | Refills: 1 | Status: SHIPPED | OUTPATIENT
Start: 2021-07-29 | End: 2021-08-20

## 2021-07-29 NOTE — PROGRESS NOTES
Subjective   Shon Sheikh is a 61 y.o. male.   Elbow Pain and Hypertension    History of Present Illness   Elbow pain and elevated blood pressure readings.  He was seen a while ago and had epicondylitis and was advised to use a brace and ice for comfort. He has been doing it but not as often as he should.  He has a long history of hypertension and brings in a record of his current readings and he has a couple of readings with slight elevation.    The following portions of the patient's history were reviewed and updated as appropriate: allergies, current medications, past family history, past medical history, past social history, past surgical history and problem list.    Review of Systems   Constitutional: Negative for activity change and fatigue.   Eyes: Negative for blurred vision.   Respiratory: Negative for shortness of breath.    Cardiovascular: Negative for chest pain and palpitations.   Musculoskeletal: Negative for neck pain.   Neurological: Negative for dizziness, light-headedness and headache.       Objective   Physical Exam  Vitals and nursing note reviewed.   Constitutional:       General: He is not in acute distress.     Appearance: He is well-developed.   HENT:      Head: Normocephalic and atraumatic.      Right Ear: External ear normal.      Left Ear: External ear normal.   Eyes:      Pupils: Pupils are equal, round, and reactive to light.   Cardiovascular:      Rate and Rhythm: Normal rate and regular rhythm.   Pulmonary:      Effort: Pulmonary effort is normal.      Breath sounds: Normal breath sounds.   Musculoskeletal:      Cervical back: Neck supple.   Lymphadenopathy:      Cervical: No cervical adenopathy.   Skin:     General: Skin is warm and dry.   Neurological:      Mental Status: He is alert and oriented to person, place, and time.           Assessment/Plan   Problem List Items Addressed This Visit        Cardiac and Vasculature    Essential hypertension - Primary    Relevant Medications     olmesartan (Benicar) 20 MG tablet      Other Visit Diagnoses     Epicondylitis elbow, medial, right            We placed him on benicar today 20 mgs.daily and he is going to track his readings and send a [employer] message.  He is going to continue with his treatments for his epicondylitis       Return if symptoms worsen or fail to improve.

## 2021-08-15 DIAGNOSIS — I10 ESSENTIAL HYPERTENSION: ICD-10-CM

## 2021-08-16 RX ORDER — MELOXICAM 7.5 MG/1
TABLET ORAL
Qty: 30 TABLET | Refills: 1 | Status: SHIPPED | OUTPATIENT
Start: 2021-08-16 | End: 2021-10-18

## 2021-08-16 RX ORDER — METOPROLOL SUCCINATE 100 MG/1
100 TABLET, EXTENDED RELEASE ORAL DAILY
Qty: 90 TABLET | Refills: 0 | Status: SHIPPED | OUTPATIENT
Start: 2021-08-16 | End: 2021-11-18

## 2021-08-20 RX ORDER — OLMESARTAN MEDOXOMIL 20 MG/1
TABLET ORAL
Qty: 30 TABLET | Refills: 1 | Status: SHIPPED | OUTPATIENT
Start: 2021-08-20 | End: 2021-09-09 | Stop reason: DRUGHIGH

## 2021-09-09 RX ORDER — OLMESARTAN MEDOXOMIL AND HYDROCHLOROTHIAZIDE 20/12.5 20; 12.5 MG/1; MG/1
1 TABLET ORAL DAILY
Qty: 30 TABLET | Refills: 1 | Status: SHIPPED | OUTPATIENT
Start: 2021-09-09 | End: 2021-10-04

## 2021-09-13 ENCOUNTER — TELEPHONE (OUTPATIENT)
Dept: FAMILY MEDICINE CLINIC | Facility: CLINIC | Age: 61
End: 2021-09-13

## 2021-09-13 RX ORDER — OLMESARTAN MEDOXOMIL 20 MG/1
TABLET ORAL
Qty: 30 TABLET | Refills: 1 | Status: SHIPPED | OUTPATIENT
Start: 2021-09-13 | End: 2021-09-13 | Stop reason: DRUGHIGH

## 2021-09-20 ENCOUNTER — APPOINTMENT (OUTPATIENT)
Dept: SLEEP MEDICINE | Facility: HOSPITAL | Age: 61
End: 2021-09-20

## 2021-09-30 ENCOUNTER — TELEPHONE (OUTPATIENT)
Dept: FAMILY MEDICINE CLINIC | Facility: CLINIC | Age: 61
End: 2021-09-30

## 2021-10-04 RX ORDER — OLMESARTAN MEDOXOMIL AND HYDROCHLOROTHIAZIDE 20/12.5 20; 12.5 MG/1; MG/1
TABLET ORAL
Qty: 30 TABLET | Refills: 1 | Status: SHIPPED | OUTPATIENT
Start: 2021-10-04 | End: 2021-11-03

## 2021-10-11 ENCOUNTER — OFFICE VISIT (OUTPATIENT)
Dept: FAMILY MEDICINE CLINIC | Facility: CLINIC | Age: 61
End: 2021-10-11

## 2021-10-11 VITALS
BODY MASS INDEX: 27.4 KG/M2 | HEIGHT: 69 IN | OXYGEN SATURATION: 98 % | DIASTOLIC BLOOD PRESSURE: 78 MMHG | SYSTOLIC BLOOD PRESSURE: 124 MMHG | WEIGHT: 185 LBS | RESPIRATION RATE: 14 BRPM | HEART RATE: 48 BPM

## 2021-10-11 DIAGNOSIS — Z01.89 ROUTINE LAB DRAW: ICD-10-CM

## 2021-10-11 DIAGNOSIS — I10 PRIMARY HYPERTENSION: Primary | ICD-10-CM

## 2021-10-11 PROCEDURE — 99213 OFFICE O/P EST LOW 20 MIN: CPT | Performed by: NURSE PRACTITIONER

## 2021-10-11 NOTE — PROGRESS NOTES
Subjective   Shon Sheikh is a 61 y.o. male.   Hypertension    History of Present Illness   Hypertension and his readings were getting higher so we added 12.5 mgs of HCTZ to his Benicar and he started having dizzy spells that were frequent and daily. He has also been watching his diet and exercising daily for the last month and has had a 9 lb weight loss.  In the interim he decided to take half of the tablet daily with helped subside most of the dizzy spells.    The following portions of the patient's history were reviewed and updated as appropriate: allergies, current medications, past family history, past medical history, past social history, past surgical history and problem list.    Review of Systems   Constitutional: Positive for activity change and appetite change. Negative for fatigue and fever.   Respiratory: Negative for cough and shortness of breath.    Neurological: Positive for dizziness and light-headedness. Negative for headache.       Objective   Physical Exam  Vitals and nursing note reviewed.   Constitutional:       Appearance: He is well-developed.   HENT:      Head: Normocephalic and atraumatic.   Eyes:      Pupils: Pupils are equal, round, and reactive to light.   Pulmonary:      Effort: Pulmonary effort is normal.   Musculoskeletal:         General: Normal range of motion.   Skin:     General: Skin is warm and dry.   Neurological:      Mental Status: He is alert and oriented to person, place, and time.           Assessment/Plan   Problem List Items Addressed This Visit     None      Visit Diagnoses     Primary hypertension    -  Primary    Routine lab draw        Relevant Orders    Comprehensive Metabolic Panel    Lipid Panel With LDL / HDL Ratio        We decided to continue with a BP diary and reduce his current 1/2 tablet to a 1/4 of a tablet and [facility]       Return in about 6 months (around 4/11/2022).

## 2021-10-12 LAB
ALBUMIN SERPL-MCNC: 4.7 G/DL (ref 3.5–5.2)
ALBUMIN/GLOB SERPL: 2.2 G/DL
ALP SERPL-CCNC: 58 U/L (ref 39–117)
ALT SERPL-CCNC: 21 U/L (ref 1–41)
AST SERPL-CCNC: 33 U/L (ref 1–40)
BILIRUB SERPL-MCNC: 0.8 MG/DL (ref 0–1.2)
BUN SERPL-MCNC: 30 MG/DL (ref 8–23)
BUN/CREAT SERPL: 23.8 (ref 7–25)
CALCIUM SERPL-MCNC: 10 MG/DL (ref 8.6–10.5)
CHLORIDE SERPL-SCNC: 102 MMOL/L (ref 98–107)
CHOLEST SERPL-MCNC: 216 MG/DL (ref 0–200)
CO2 SERPL-SCNC: 28.3 MMOL/L (ref 22–29)
CREAT SERPL-MCNC: 1.26 MG/DL (ref 0.76–1.27)
GLOBULIN SER CALC-MCNC: 2.1 GM/DL
GLUCOSE SERPL-MCNC: 85 MG/DL (ref 65–99)
HDLC SERPL-MCNC: 54 MG/DL (ref 40–60)
LDLC SERPL CALC-MCNC: 147 MG/DL (ref 0–100)
LDLC/HDLC SERPL: 2.69 {RATIO}
POTASSIUM SERPL-SCNC: 6 MMOL/L (ref 3.5–5.2)
PROT SERPL-MCNC: 6.8 G/DL (ref 6–8.5)
SODIUM SERPL-SCNC: 145 MMOL/L (ref 136–145)
TRIGL SERPL-MCNC: 85 MG/DL (ref 0–150)
VLDLC SERPL CALC-MCNC: 15 MG/DL (ref 5–40)

## 2021-10-18 ENCOUNTER — OFFICE VISIT (OUTPATIENT)
Dept: SLEEP MEDICINE | Facility: HOSPITAL | Age: 61
End: 2021-10-18

## 2021-10-18 VITALS
BODY MASS INDEX: 27.25 KG/M2 | SYSTOLIC BLOOD PRESSURE: 138 MMHG | HEIGHT: 69 IN | DIASTOLIC BLOOD PRESSURE: 71 MMHG | WEIGHT: 184 LBS | OXYGEN SATURATION: 100 %

## 2021-10-18 DIAGNOSIS — G47.33 OBSTRUCTIVE SLEEP APNEA: Primary | ICD-10-CM

## 2021-10-18 PROCEDURE — G0463 HOSPITAL OUTPT CLINIC VISIT: HCPCS

## 2021-10-18 RX ORDER — MELOXICAM 7.5 MG/1
TABLET ORAL
Qty: 30 TABLET | Refills: 1 | Status: SHIPPED | OUTPATIENT
Start: 2021-10-18 | End: 2023-01-27

## 2021-10-18 NOTE — PROGRESS NOTES
[facility]  Telephone: [phone] / Fax: [phone] [place]  Telephone: [phone] / Fax: [phone] Duyen Casanova    PCP: Merry Welsh APRN    Reason for visit: SARAH f/u    Shon Sheikh is a 61 y.o.male  was last seen at [facility] sleep lab 1 year ago. He uses the machine and benefits. He feels that CPAP work. Mask fits well. It does not leak. His pressures are set at 5-15cm. They appear adequate. Sleep quality has improved on CPAP and excessive sleepiness/snoring is no longer an issue.  There is no complaint of aerophagia, excessive dry mouth or air leak. His health has been stable.  He is happy with DME provider. His bedtime schedule is 11pm-7am. ESS is 3. He uses nasal pillow mask and replaces it every 2 months. He uses ozone and UV  to disinfect CPAP accessories.    SH- no tobacco, drinks 4-5 alc per week, 2 caffeine per day.    ROS- negative.    DME [facility]    Current Medications:    Current Outpatient Medications:   •  aspirin 81 MG EC tablet, Take 81 mg by mouth Daily., Disp: , Rfl:   •  Cholecalciferol (VITAMIN D PO), Take  by mouth., Disp: , Rfl:   •  fluticasone (FLONASE) 50 MCG/ACT nasal spray, 2 SPRAYS INTO THE NOSTRIL(S) AS DIRECTED BY PROVIDER DAILY., Disp: 48 mL, Rfl: 1  •  meloxicam (MOBIC) 7.5 MG tablet, TAKE 1 TABLET BY MOUTH EVERY DAY, Disp: 30 tablet, Rfl: 1  •  metoprolol succinate XL (TOPROL-XL) 100 MG 24 hr tablet, TAKE 1 TABLET BY MOUTH DAILY. APPOINTMENT NEEDED FOR MORE REFILLS, Disp: 90 tablet, Rfl: 0  •  olmesartan-hydrochlorothiazide (BENICAR HCT) 20-12.5 MG per tablet, TAKE 1 TABLET BY MOUTH EVERY DAY, Disp: 30 tablet, Rfl: 1  •  tamsulosin (FLOMAX) 0.4 MG capsule 24 hr capsule, TAKE 1 CAPSULE BY MOUTH EVERY DAY AT NIGHT, Disp: 90 capsule, Rfl: 1   also entered in Sleep Questionnaire    Patient  has a past medical history of BPH (benign prostatic hyperplasia), Hypertension, and Vitamin D deficiency.    I have reviewed the Past Medical  "History, Past Surgical History, Social History and Family History.    Vital Signs /71   Ht 175.3 cm (69.02\")   Wt 83.5 kg (184 lb)   SpO2 100%   BMI 27.16 kg/m²  Body mass index is 27.16 kg/m².    General Alert and oriented. No acute distress noted   Pharynx/Throat Class II   Mallampati airway, large tongue, no evidence of redundant lateral pharyngeal tissue. No oral lesions. No thrush. Moist mucous membranes.   Head Normocephalic. Symmetrical. Atraumatic.    Nose No septal deviation. No drainage   Chest Wall Normal shape. Symmetric expansion with respiration. No tenderness.   Neck Trachea midline, no thyromegaly or adenopathy    Lungs Clear to auscultation bilaterally. No wheezes. No rhonchi. No rales. Respirations regular, even and unlabored.   Heart Regular rhythm and normal rate. Normal S1 and S2. No murmur   Abdomen Soft, non-tender and non-distended. Normal bowel sounds. No masses.   Extremities Moves all extremities well. No edema   Psychiatric Normal mood and affect.     Testing:  · Download 9/15/21-10/14/21 87% use with average nightly use of 6 hours and 19 minutes on auto CPAP 5-15cm with avg pressure of 8.8cm, AHI 1.2, leak of 4.4 L.min.    Study:  · 2/4/19  The patient tolerated the home sleep testing with monitoring time of 387 minutes. The data obtained make this a technically adequate study. The apnea hypopneas index(AHI) was 18.7 per sleep hour.  The AHI during supine position was 32.2 per sleep hour. Mean heart rate of 65.8 BPM.  Snoring was noted 85.3% of sleep time. Lowest oxygen saturation during the study was 76%. Saturation below 89% was noted for 32.7 mins.        Impression:  1. Obstructive sleep apnea          Plan:  Patient uses the CPAP device and benefits from its use in terms of reduction of hypersomnia and snoring.  AHI appears to be within adequate range. I reviewed download report and original sleep study report with the patient.     Weight loss will be strongly beneficial " to reduce the severity of sleep-disordered breathing.  Caution during activities that require prolonged concentration is strongly advised if sleepiness returns. Changing of PAP supplies regularly is important for effective use.  Nasal pillows should be changed twice per month, tubing every 3 months, disposable filters twice per  month, water chamber every 6 months. I sent order to DME to replace supplies.    Follow up with Dr. Norton in one year    Thank you for allowing me to participate in your patient's care.      CARLO Soto  [facility]  Phone: [phone]      Part of this note may be an electronic transcription/translation of spoken language to printed text using the Dragon Dictation System.

## 2021-10-22 ENCOUNTER — FLU SHOT (OUTPATIENT)
Dept: FAMILY MEDICINE CLINIC | Facility: CLINIC | Age: 61
End: 2021-10-22

## 2021-10-22 DIAGNOSIS — Z23 NEED FOR VACCINATION: Primary | ICD-10-CM

## 2021-10-22 PROCEDURE — 90686 IIV4 VACC NO PRSV 0.5 ML IM: CPT | Performed by: NURSE PRACTITIONER

## 2021-10-22 PROCEDURE — 90471 IMMUNIZATION ADMIN: CPT | Performed by: NURSE PRACTITIONER

## 2021-11-03 RX ORDER — OLMESARTAN MEDOXOMIL AND HYDROCHLOROTHIAZIDE 20/12.5 20; 12.5 MG/1; MG/1
TABLET ORAL
Qty: 30 TABLET | Refills: 1 | Status: SHIPPED | OUTPATIENT
Start: 2021-11-03 | End: 2022-03-08 | Stop reason: SDUPTHER

## 2021-11-18 DIAGNOSIS — I10 ESSENTIAL HYPERTENSION: ICD-10-CM

## 2021-11-18 RX ORDER — METOPROLOL SUCCINATE 100 MG/1
100 TABLET, EXTENDED RELEASE ORAL DAILY
Qty: 90 TABLET | Refills: 3 | Status: SHIPPED | OUTPATIENT
Start: 2021-11-18 | End: 2022-05-18 | Stop reason: SDUPTHER

## 2021-12-03 DIAGNOSIS — N40.1 BENIGN PROSTATIC HYPERPLASIA WITH URINARY HESITANCY: ICD-10-CM

## 2021-12-03 DIAGNOSIS — R39.11 BENIGN PROSTATIC HYPERPLASIA WITH URINARY HESITANCY: ICD-10-CM

## 2021-12-03 RX ORDER — TAMSULOSIN HYDROCHLORIDE 0.4 MG/1
CAPSULE ORAL
Qty: 90 CAPSULE | Refills: 1 | Status: SHIPPED | OUTPATIENT
Start: 2021-12-03 | End: 2022-04-04 | Stop reason: SDUPTHER

## 2022-01-10 DIAGNOSIS — Z80.42 FAMILY HISTORY OF PROSTATE CANCER: Primary | ICD-10-CM

## 2022-02-25 ENCOUNTER — PATIENT MESSAGE (OUTPATIENT)
Dept: FAMILY MEDICINE CLINIC | Facility: CLINIC | Age: 62
End: 2022-02-25

## 2022-03-08 RX ORDER — OLMESARTAN MEDOXOMIL AND HYDROCHLOROTHIAZIDE 20/12.5 20; 12.5 MG/1; MG/1
1 TABLET ORAL DAILY
Qty: 90 TABLET | Refills: 0 | Status: SHIPPED | OUTPATIENT
Start: 2022-03-08 | End: 2022-06-13

## 2022-04-04 DIAGNOSIS — R39.11 BENIGN PROSTATIC HYPERPLASIA WITH URINARY HESITANCY: ICD-10-CM

## 2022-04-04 DIAGNOSIS — N40.1 BENIGN PROSTATIC HYPERPLASIA WITH URINARY HESITANCY: ICD-10-CM

## 2022-04-04 RX ORDER — TAMSULOSIN HYDROCHLORIDE 0.4 MG/1
1 CAPSULE ORAL NIGHTLY
Qty: 90 CAPSULE | Refills: 1 | Status: SHIPPED | OUTPATIENT
Start: 2022-04-04 | End: 2022-10-03 | Stop reason: SDUPTHER

## 2022-04-11 ENCOUNTER — OFFICE VISIT (OUTPATIENT)
Dept: FAMILY MEDICINE CLINIC | Facility: CLINIC | Age: 62
End: 2022-04-11

## 2022-04-11 VITALS
DIASTOLIC BLOOD PRESSURE: 82 MMHG | RESPIRATION RATE: 14 BRPM | BODY MASS INDEX: 28.73 KG/M2 | HEIGHT: 69 IN | OXYGEN SATURATION: 98 % | WEIGHT: 194 LBS | HEART RATE: 52 BPM | SYSTOLIC BLOOD PRESSURE: 124 MMHG

## 2022-04-11 DIAGNOSIS — I10 PRIMARY HYPERTENSION: Primary | ICD-10-CM

## 2022-04-11 PROCEDURE — 99213 OFFICE O/P EST LOW 20 MIN: CPT | Performed by: NURSE PRACTITIONER

## 2022-04-11 NOTE — PROGRESS NOTES
Subjective   Shon Sheikh is a 61 y.o. male.   Hypertension    History of Present Illness   Has a new job and his BP was starting to go up and he decided that he would half his Benicar and take it in the morning and at night and his BP has been in a good range.  His pulse rate has been low for years and he does quite a bit of cardiovascular exercise which I think is adding to it. He has no complaints and feels fine.    The following portions of the patient's history were reviewed and updated as appropriate: allergies, current medications, past family history, past medical history, past social history, past surgical history and problem list.    Review of Systems   Constitutional: Negative for activity change and appetite change.   Eyes: Negative for blurred vision.   Respiratory: Negative for cough and shortness of breath.    Cardiovascular: Negative for chest pain and palpitations.   Musculoskeletal: Negative for neck pain.   Neurological: Negative for dizziness and headache.       Objective   Physical Exam  Vitals and nursing note reviewed.   HENT:      Head: Normocephalic and atraumatic.      Right Ear: Tympanic membrane normal.      Left Ear: Tympanic membrane normal.   Cardiovascular:      Rate and Rhythm: Normal rate and regular rhythm.      Pulses: Normal pulses.      Heart sounds: Normal heart sounds.   Pulmonary:      Effort: Pulmonary effort is normal.   Musculoskeletal:         General: Normal range of motion.      Cervical back: Normal range of motion.   Skin:     General: Skin is warm.   Neurological:      General: No focal deficit present.           Assessment/Plan   Problem List Items Addressed This Visit    None     Visit Diagnoses     Primary hypertension    -  Primary        I think his pulse rate is fine and that is just his cardiovascular system.  Continue current medications       Return in about 6 months (around 10/11/2022) for Annual.

## 2022-05-18 DIAGNOSIS — I10 ESSENTIAL HYPERTENSION: ICD-10-CM

## 2022-05-18 NOTE — TELEPHONE ENCOUNTER
Last OV: 4/11/2022    Next OV: not scheduled  (not overdue till 10/11/2022)    Last Refill: 11/18/2021  
17-Sep-2021

## 2022-05-19 RX ORDER — METOPROLOL SUCCINATE 100 MG/1
100 TABLET, EXTENDED RELEASE ORAL DAILY
Qty: 90 TABLET | Refills: 1 | Status: SHIPPED | OUTPATIENT
Start: 2022-05-19 | End: 2022-11-14 | Stop reason: SDUPTHER

## 2022-06-13 RX ORDER — OLMESARTAN MEDOXOMIL AND HYDROCHLOROTHIAZIDE 20/12.5 20; 12.5 MG/1; MG/1
TABLET ORAL
Qty: 90 TABLET | Refills: 0 | Status: SHIPPED | OUTPATIENT
Start: 2022-06-13 | End: 2022-09-17 | Stop reason: SDUPTHER

## 2022-06-24 ENCOUNTER — OFFICE VISIT (OUTPATIENT)
Dept: SLEEP MEDICINE | Facility: HOSPITAL | Age: 62
End: 2022-06-24

## 2022-06-24 VITALS
HEIGHT: 69 IN | OXYGEN SATURATION: 98 % | WEIGHT: 194 LBS | DIASTOLIC BLOOD PRESSURE: 73 MMHG | BODY MASS INDEX: 28.73 KG/M2 | SYSTOLIC BLOOD PRESSURE: 133 MMHG | HEART RATE: 49 BPM

## 2022-06-24 DIAGNOSIS — G47.33 OSA (OBSTRUCTIVE SLEEP APNEA): Primary | ICD-10-CM

## 2022-06-24 PROCEDURE — G0463 HOSPITAL OUTPT CLINIC VISIT: HCPCS

## 2022-06-24 NOTE — PROGRESS NOTES
"[facility]  Telephone: [phone] / Fax: [phone] [place]  Telephone: [phone] / Fax: [phone] Duyen Casanova    PCP: Merry Welsh APRN    Reason for visit: SARAH f/u    Shon Sheikh is a 61 y.o.male  was last seen at [facility] sleep lab in October 2021. He has been on the CPAP machine since 2019. He has underlying moderate SARAH. Recently, CPAP started making \"whiny noises\". His machine is a little bit over 3 years old.  He also has questions about inspire today.  Machine pressures are set at 5-15 cm H2O. His bedtime schedule is 11pm-7am. ESS is 4. He uses nasal pillow mask that fits well. He is unaware of excessive leaks or dry mouth.    SH- no tobacco, 5 alc per week, 2 caffeine per day, no energy drinks.    ROS- negative.    DME- [facility]    Current Medications:    Current Outpatient Medications:   •  aspirin 81 MG EC tablet, Take 81 mg by mouth Daily., Disp: , Rfl:   •  Cholecalciferol (VITAMIN D PO), Take  by mouth., Disp: , Rfl:   •  fluticasone (FLONASE) 50 MCG/ACT nasal spray, 2 SPRAYS INTO THE NOSTRIL(S) AS DIRECTED BY PROVIDER DAILY., Disp: 48 mL, Rfl: 1  •  meloxicam (MOBIC) 7.5 MG tablet, TAKE 1 TABLET BY MOUTH EVERY DAY, Disp: 30 tablet, Rfl: 1  •  metoprolol succinate XL (TOPROL-XL) 100 MG 24 hr tablet, Take 1 tablet by mouth Daily., Disp: 90 tablet, Rfl: 1  •  olmesartan-hydrochlorothiazide (BENICAR HCT) 20-12.5 MG per tablet, TAKE ONE TABLET BY MOUTH DAILY, Disp: 90 tablet, Rfl: 0  •  tamsulosin (FLOMAX) 0.4 MG capsule 24 hr capsule, Take 1 capsule by mouth Every Night., Disp: 90 capsule, Rfl: 1   also entered in Sleep Questionnaire    Patient  has a past medical history of BPH (benign prostatic hyperplasia), Hypertension, and Vitamin D deficiency.    I have reviewed the Past Medical History, Past Surgical History, Social History and Family History.    Vital Signs /73   Pulse (!) 49   Ht 175.3 cm (69\")   Wt 88 kg (194 lb)   SpO2 98%   BMI 28.65 kg/m²  " Body mass index is 28.65 kg/m².    General Alert and oriented. No acute distress noted   Pharynx/Throat Class II  Mallampati airway, large tongue, no evidence of redundant lateral pharyngeal tissue. No oral lesions. No thrush. Moist mucous membranes.   Head Normocephalic. Symmetrical. Atraumatic.    Nose No septal deviation. No drainage   Chest Wall Normal shape. Symmetric expansion with respiration. No tenderness.   Neck Trachea midline, no thyromegaly or adenopathy    Lungs Clear to auscultation bilaterally. No wheezes. No rhonchi. No rales. Respirations regular, even and unlabored.   Heart Regular rhythm and normal rate. Normal S1 and S2. No murmur   Abdomen Soft, non-tender and non-distended. Normal bowel sounds. No masses.   Extremities Moves all extremities well. No edema   Psychiatric Normal mood and affect.     Testing:  Download 3/24/22-6/21/22 89% use with average nightly use of 6 hours and 41 minutes on auto CPAP 5-15cm H2O, P95% 9.2cm H2O, AHI 1.1, leak of 7.9 L/min.    Study:  · 2/4/19  The patient tolerated the home sleep testing with monitoring time of 387 minutes. The data obtained make this a technically adequate study. The apnea hypopneas index(AHI) was 18.7 per sleep hour.  The AHI during supine position was 32.2 per sleep hour. Mean heart rate of 65.8 BPM.  Snoring was noted 85.3% of sleep time. Lowest oxygen saturation during the study was 76%. Saturation below 89% was noted for 32.7 mins.     Impression:  1. SARAH (obstructive sleep apnea)          Plan:  I will ask our staff to send order to [facility] to repair/ replace current machine  I discussed inspire device with patient and answered his questions about qualifying criteria.  After our discussion, he prefers to stay on the CPAP machine and use it nightly.   He has good response to therapy. AHI on treatment is 1.1. Leak is minimal.  He uses the machine and benefits from its use. He will f/u with us in 1 year.      Thank you for allowing  me to participate in your patient's care.      CARLO Soto  [facility] [place]  Phone: [phone]      Part of this note may be an electronic transcription/translation of spoken language to printed text using the Dragon Dictation System.

## 2022-07-14 ENCOUNTER — OFFICE VISIT (OUTPATIENT)
Dept: FAMILY MEDICINE CLINIC | Facility: CLINIC | Age: 62
End: 2022-07-14

## 2022-07-14 VITALS
SYSTOLIC BLOOD PRESSURE: 124 MMHG | RESPIRATION RATE: 14 BRPM | HEIGHT: 69 IN | OXYGEN SATURATION: 97 % | WEIGHT: 190 LBS | HEART RATE: 50 BPM | DIASTOLIC BLOOD PRESSURE: 72 MMHG | BODY MASS INDEX: 28.14 KG/M2

## 2022-07-14 DIAGNOSIS — R09.81 NASAL CONGESTION: ICD-10-CM

## 2022-07-14 DIAGNOSIS — R05.9 COUGH: Primary | ICD-10-CM

## 2022-07-14 PROCEDURE — 99213 OFFICE O/P EST LOW 20 MIN: CPT | Performed by: NURSE PRACTITIONER

## 2022-07-14 RX ORDER — METHYLPREDNISOLONE 4 MG/1
TABLET ORAL
Qty: 1 EACH | Refills: 0 | Status: SHIPPED | OUTPATIENT
Start: 2022-07-14 | End: 2023-01-27

## 2022-07-14 NOTE — PROGRESS NOTES
Subjective   Shon Sheikh is a 61 y.o. male.     History of Present Illness   Patient presents with c/o cough and nasal congestion that started two weeks ago. He reports that he was exposed to two people that were ill. He states that his symptoms started with nasal congestion and low grade fever. He reports that he's continued to have cough and nasal congestion. He has taken coricidin for his symptoms.   The following portions of the patient's history were reviewed and updated as appropriate: allergies, current medications, past family history, past medical history, past social history, past surgical history and problem list.    Review of Systems   Constitutional: Negative for appetite change, chills, fatigue and fever.   HENT: Positive for congestion and postnasal drip. Negative for ear pain, rhinorrhea, sinus pressure, sneezing and sore throat.    Eyes: Negative for redness and itching.   Respiratory: Positive for cough. Negative for chest tightness, shortness of breath and wheezing.    Cardiovascular: Negative for chest pain, palpitations and leg swelling.   Musculoskeletal: Negative for myalgias.   Allergic/Immunologic: Negative.    Neurological: Negative for dizziness and headache.       Objective   Physical Exam  Vitals and nursing note reviewed.   Constitutional:       Appearance: Normal appearance. He is well-developed and overweight.   HENT:      Head: Normocephalic and atraumatic.      Right Ear: Ear canal and external ear normal. A middle ear effusion is present.      Left Ear: Ear canal and external ear normal. A middle ear effusion is present.      Nose: Nose normal.      Right Sinus: No maxillary sinus tenderness or frontal sinus tenderness.      Left Sinus: No maxillary sinus tenderness or frontal sinus tenderness.      Mouth/Throat:      Mouth: Mucous membranes are moist.      Pharynx: Oropharynx is clear. No pharyngeal swelling, oropharyngeal exudate or posterior oropharyngeal erythema.       Tonsils: No tonsillar exudate.   Eyes:      General:         Right eye: No discharge.         Left eye: No discharge.      Conjunctiva/sclera: Conjunctivae normal.      Pupils: Pupils are equal, round, and reactive to light.   Neck:      Thyroid: No thyromegaly.   Cardiovascular:      Rate and Rhythm: Normal rate and regular rhythm.      Heart sounds: Normal heart sounds. No murmur heard.  Pulmonary:      Effort: Pulmonary effort is normal. No tachypnea or respiratory distress.      Breath sounds: Normal breath sounds. No decreased breath sounds, wheezing or rales.   Musculoskeletal:      Cervical back: Normal range of motion and neck supple.   Lymphadenopathy:      Cervical: No cervical adenopathy.   Skin:     General: Skin is warm and dry.   Neurological:      Mental Status: He is alert and oriented to person, place, and time.   Psychiatric:         Behavior: Behavior normal.         Thought Content: Thought content normal.         Judgment: Judgment normal.         Vitals:    07/14/22 1513   BP: 124/72   Pulse: 50   Resp: 14   SpO2: 97%     Body mass index is 28.06 kg/m².    Procedures    Assessment & Plan   Problems Addressed this Visit    None     Visit Diagnoses     Cough    -  Primary    Nasal congestion        Relevant Medications    methylPREDNISolone (MEDROL) 4 MG dose pack      Diagnoses       Codes Comments    Cough    -  Primary ICD-10-CM: R05.9  ICD-9-CM: 786.2     Nasal congestion     ICD-10-CM: R09.81  ICD-9-CM: 478.19         Zyrtec over the counter as directed.   Medrol dose pack          Return if symptoms worsen or fail to improve.

## 2022-09-17 DIAGNOSIS — I10 ESSENTIAL HYPERTENSION: Primary | ICD-10-CM

## 2022-09-17 RX ORDER — OLMESARTAN MEDOXOMIL AND HYDROCHLOROTHIAZIDE 20/12.5 20; 12.5 MG/1; MG/1
1 TABLET ORAL DAILY
Qty: 90 TABLET | Refills: 1 | Status: SHIPPED | OUTPATIENT
Start: 2022-09-17

## 2022-10-03 DIAGNOSIS — N40.1 BENIGN PROSTATIC HYPERPLASIA WITH URINARY HESITANCY: ICD-10-CM

## 2022-10-03 DIAGNOSIS — R39.11 BENIGN PROSTATIC HYPERPLASIA WITH URINARY HESITANCY: ICD-10-CM

## 2022-10-03 RX ORDER — TAMSULOSIN HYDROCHLORIDE 0.4 MG/1
1 CAPSULE ORAL NIGHTLY
Qty: 90 CAPSULE | Refills: 1 | Status: SHIPPED | OUTPATIENT
Start: 2022-10-03

## 2022-10-03 NOTE — TELEPHONE ENCOUNTER
Tristan pt    Last OV: 7/14/2022    Next OV: not scheduled  (overdue since 6/2/2021 for annual)    Last Refill: 4/4/2022

## 2022-10-07 ENCOUNTER — APPOINTMENT (OUTPATIENT)
Dept: SLEEP MEDICINE | Facility: HOSPITAL | Age: 62
End: 2022-10-07

## 2022-11-14 DIAGNOSIS — I10 ESSENTIAL HYPERTENSION: ICD-10-CM

## 2022-11-14 RX ORDER — METOPROLOL SUCCINATE 100 MG/1
100 TABLET, EXTENDED RELEASE ORAL DAILY
Qty: 90 TABLET | Refills: 1 | Status: SHIPPED | OUTPATIENT
Start: 2022-11-14

## 2022-12-29 DIAGNOSIS — Z79.899 HIGH RISK MEDICATION USE: ICD-10-CM

## 2022-12-29 DIAGNOSIS — Z12.5 SCREENING FOR PROSTATE CANCER: ICD-10-CM

## 2022-12-29 DIAGNOSIS — E78.2 MIXED HYPERLIPIDEMIA: Primary | ICD-10-CM

## 2022-12-29 DIAGNOSIS — Z11.59 NEED FOR HEPATITIS C SCREENING TEST: ICD-10-CM

## 2023-01-06 LAB
ALBUMIN SERPL-MCNC: 4.5 G/DL (ref 3.5–5.2)
ALBUMIN/GLOB SERPL: 1.8 G/DL
ALP SERPL-CCNC: 62 U/L (ref 39–117)
ALT SERPL-CCNC: 18 U/L (ref 1–41)
AST SERPL-CCNC: 23 U/L (ref 1–40)
BILIRUB SERPL-MCNC: 0.4 MG/DL (ref 0–1.2)
BUN SERPL-MCNC: 32 MG/DL (ref 8–23)
BUN/CREAT SERPL: 25 (ref 7–25)
CALCIUM SERPL-MCNC: 9.6 MG/DL (ref 8.6–10.5)
CHLORIDE SERPL-SCNC: 104 MMOL/L (ref 98–107)
CHOLEST SERPL-MCNC: 247 MG/DL (ref 0–200)
CO2 SERPL-SCNC: 31.6 MMOL/L (ref 22–29)
CREAT SERPL-MCNC: 1.28 MG/DL (ref 0.76–1.27)
EGFRCR SERPLBLD CKD-EPI 2021: 63.7 ML/MIN/1.73
GLOBULIN SER CALC-MCNC: 2.5 GM/DL
GLUCOSE SERPL-MCNC: 88 MG/DL (ref 65–99)
HDLC SERPL-MCNC: 49 MG/DL (ref 40–60)
LDLC SERPL CALC-MCNC: 182 MG/DL (ref 0–100)
LDLC/HDLC SERPL: 3.66 {RATIO}
POTASSIUM SERPL-SCNC: 4.5 MMOL/L (ref 3.5–5.2)
PROT SERPL-MCNC: 7 G/DL (ref 6–8.5)
PSA SERPL-MCNC: 3.41 NG/ML (ref 0–4)
SODIUM SERPL-SCNC: 143 MMOL/L (ref 136–145)
TRIGL SERPL-MCNC: 94 MG/DL (ref 0–150)
VLDLC SERPL CALC-MCNC: 16 MG/DL (ref 5–40)

## 2023-01-07 LAB
BASOPHILS # BLD AUTO: 0.04 10*3/MM3 (ref 0–0.2)
BASOPHILS NFR BLD AUTO: 0.7 % (ref 0–1.5)
EOSINOPHIL # BLD AUTO: 0.19 10*3/MM3 (ref 0–0.4)
EOSINOPHIL NFR BLD AUTO: 3.4 % (ref 0.3–6.2)
ERYTHROCYTE [DISTWIDTH] IN BLOOD BY AUTOMATED COUNT: 12.3 % (ref 12.3–15.4)
HCT VFR BLD AUTO: 42.9 % (ref 37.5–51)
HCV AB S/CO SERPL IA: <0.1 S/CO RATIO (ref 0–0.9)
HGB BLD-MCNC: 14.7 G/DL (ref 13–17.7)
IMM GRANULOCYTES # BLD AUTO: 0.01 10*3/MM3 (ref 0–0.05)
IMM GRANULOCYTES NFR BLD AUTO: 0.2 % (ref 0–0.5)
LYMPHOCYTES # BLD AUTO: 2.19 10*3/MM3 (ref 0.7–3.1)
LYMPHOCYTES NFR BLD AUTO: 38.8 % (ref 19.6–45.3)
MCH RBC QN AUTO: 29.2 PG (ref 26.6–33)
MCHC RBC AUTO-ENTMCNC: 34.3 G/DL (ref 31.5–35.7)
MCV RBC AUTO: 85.3 FL (ref 79–97)
MONOCYTES # BLD AUTO: 0.42 10*3/MM3 (ref 0.1–0.9)
MONOCYTES NFR BLD AUTO: 7.4 % (ref 5–12)
NEUTROPHILS # BLD AUTO: 2.8 10*3/MM3 (ref 1.7–7)
NEUTROPHILS NFR BLD AUTO: 49.5 % (ref 42.7–76)
NRBC BLD AUTO-RTO: 0 /100 WBC (ref 0–0.2)
PLATELET # BLD AUTO: 220 10*3/MM3 (ref 140–450)
RBC # BLD AUTO: 5.03 10*6/MM3 (ref 4.14–5.8)
WBC # BLD AUTO: 5.65 10*3/MM3 (ref 3.4–10.8)

## 2023-01-27 ENCOUNTER — OFFICE VISIT (OUTPATIENT)
Dept: FAMILY MEDICINE CLINIC | Facility: CLINIC | Age: 63
End: 2023-01-27
Payer: COMMERCIAL

## 2023-01-27 VITALS
SYSTOLIC BLOOD PRESSURE: 120 MMHG | WEIGHT: 196 LBS | OXYGEN SATURATION: 97 % | DIASTOLIC BLOOD PRESSURE: 72 MMHG | BODY MASS INDEX: 28.94 KG/M2 | RESPIRATION RATE: 18 BRPM | HEART RATE: 53 BPM

## 2023-01-27 DIAGNOSIS — Z13.228 ENCOUNTER FOR SCREENING FOR OTHER METABOLIC DISORDERS: ICD-10-CM

## 2023-01-27 DIAGNOSIS — R79.89 ELEVATED SERUM CREATININE: ICD-10-CM

## 2023-01-27 DIAGNOSIS — Z00.00 ANNUAL PHYSICAL EXAM: Primary | ICD-10-CM

## 2023-01-27 DIAGNOSIS — Z13.220 SCREENING FOR HYPERLIPIDEMIA: ICD-10-CM

## 2023-01-27 PROCEDURE — 99396 PREV VISIT EST AGE 40-64: CPT | Performed by: NURSE PRACTITIONER

## 2023-01-27 NOTE — PROGRESS NOTES
Preventive Exam    History of Present Illness: Shon Sheikh is a 62 y.o. here for check up and review of routine health maintenance. he states he is doing well and has no concerns.    Patient has BPH and flomax was recently increased. He had labs on 1/6/2023 and creatinine and BUN was elevated.  He has seen nephrology previously in 2018.  I discussed with patient that he really needs to increase his water intake, particularly with increasing his flomax. Patient verbalized understanding.     Past medical history, surgical history and family history have been reviewed.     Review of Systems   Constitutional: Negative for appetite change, chills, fatigue, fever, unexpected weight gain and unexpected weight loss.   HENT: Positive for congestion and rhinorrhea. Negative for dental problem, mouth sores, nosebleeds, sinus pressure, sore throat, trouble swallowing and voice change.         Dental exam is up to date.    Eyes: Negative.  Negative for blurred vision, double vision, photophobia and pain.        Wears glasses. Eye exam is due.    Respiratory: Negative for cough, chest tightness, shortness of breath and wheezing.    Cardiovascular: Negative for chest pain, palpitations and leg swelling.   Gastrointestinal: Negative for abdominal pain, constipation, diarrhea, nausea and vomiting.   Endocrine: Negative.  Negative for cold intolerance and heat intolerance.   Genitourinary: Positive for difficulty urinating, nocturia and urgency. Negative for decreased libido and frequency.   Musculoskeletal: Negative for arthralgias, back pain, joint swelling and myalgias.   Skin: Negative.    Allergic/Immunologic: Negative for environmental allergies and food allergies.   Neurological: Positive for numbness (toes intermittently). Negative for dizziness, weakness and headache.   Hematological: Negative.  Does not bruise/bleed easily.   Psychiatric/Behavioral: Negative.  Negative for dysphoric mood, sleep disturbance, suicidal  ideas and depressed mood. The patient is not nervous/anxious.        PHYSICAL EXAM    Vitals:    01/27/23 0937   BP: 120/72   Pulse: 53   Resp: 18   SpO2: 97%       Body mass index is 28.94 kg/m².     Physical Exam  Vitals and nursing note reviewed.   Constitutional:       Appearance: Normal appearance. He is well-developed and normal weight.   HENT:      Head: Normocephalic and atraumatic.      Right Ear: Tympanic membrane, ear canal and external ear normal.      Left Ear: Tympanic membrane, ear canal and external ear normal.      Nose: Nose normal.      Mouth/Throat:      Lips: Pink.      Mouth: Mucous membranes are moist.      Tongue: No lesions.      Palate: No mass and lesions.      Pharynx: Oropharynx is clear. Uvula midline.      Tonsils: No tonsillar exudate.   Eyes:      Conjunctiva/sclera: Conjunctivae normal.      Pupils: Pupils are equal, round, and reactive to light.   Neck:      Thyroid: No thyromegaly.   Cardiovascular:      Rate and Rhythm: Normal rate and regular rhythm.      Pulses: Normal pulses.           Dorsalis pedis pulses are 2+ on the right side and 2+ on the left side.        Posterior tibial pulses are 2+ on the right side and 2+ on the left side.      Heart sounds: Normal heart sounds. No murmur heard.  Pulmonary:      Effort: Pulmonary effort is normal.      Breath sounds: Normal breath sounds.   Abdominal:      General: Bowel sounds are normal. There is no distension.      Palpations: Abdomen is soft.      Tenderness: There is no abdominal tenderness.   Musculoskeletal:         General: No deformity. Normal range of motion.      Cervical back: Normal range of motion and neck supple.      Right lower leg: No edema.      Left lower leg: No edema.   Lymphadenopathy:      Head:      Right side of head: No submental, submandibular, tonsillar, preauricular, posterior auricular or occipital adenopathy.      Left side of head: No submental, submandibular, tonsillar, preauricular, posterior  auricular or occipital adenopathy.      Cervical: No cervical adenopathy.      Right cervical: No superficial, deep or posterior cervical adenopathy.     Left cervical: No superficial, deep or posterior cervical adenopathy.      Upper Body:      Right upper body: No supraclavicular adenopathy.      Left upper body: No supraclavicular adenopathy.   Skin:     General: Skin is warm and dry.      Capillary Refill: Capillary refill takes 2 to 3 seconds.   Neurological:      General: No focal deficit present.      Mental Status: He is alert and oriented to person, place, and time.      Cranial Nerves: No cranial nerve deficit.      Sensory: Sensation is intact.      Motor: Motor function is intact.      Coordination: Coordination is intact.      Gait: Gait is intact.   Psychiatric:         Attention and Perception: Attention and perception normal.         Mood and Affect: Mood and affect normal.         Speech: Speech normal.         Behavior: Behavior normal. Behavior is cooperative.         Thought Content: Thought content normal.         Cognition and Memory: Cognition and memory normal.         Judgment: Judgment normal.         Procedures    Diagnoses and all orders for this visit:    1. Annual physical exam (Primary)    2. Screening for hyperlipidemia    3. Encounter for screening for other metabolic disorders    4. Elevated serum creatinine  -     Comprehensive Metabolic Panel        Problems Addressed this Visit    None  Visit Diagnoses     Annual physical exam    -  Primary    Screening for hyperlipidemia        Encounter for screening for other metabolic disorders        Elevated serum creatinine        Relevant Orders    Comprehensive Metabolic Panel      Diagnoses       Codes Comments    Annual physical exam    -  Primary ICD-10-CM: Z00.00  ICD-9-CM: V70.0     Screening for hyperlipidemia     ICD-10-CM: Z13.220  ICD-9-CM: V77.91     Encounter for screening for other metabolic disorders     ICD-10-CM:  Z13.228  ICD-9-CM: V77.99     Elevated serum creatinine     ICD-10-CM: R79.89  ICD-9-CM: 790.99         Repeat CMP due to elevated creatinine    Routine health maintenance reviewed and discussed with Shon Sheikh.    Preventative counseling regarding healthy diet and exercise.   Pt reports that he wears a seatbelt regularly.  Return in about 6 months (around 7/27/2023) for Recheck BP.

## 2023-01-27 NOTE — PATIENT INSTRUCTIONS
Return in about 6 months (around 7/27/2023) for Recheck BP.  Call with any questions or concerns.

## 2023-01-28 LAB
ALBUMIN SERPL-MCNC: 4.8 G/DL (ref 3.5–5.2)
ALBUMIN/GLOB SERPL: 2 G/DL
ALP SERPL-CCNC: 73 U/L (ref 39–117)
ALT SERPL-CCNC: 18 U/L (ref 1–41)
AST SERPL-CCNC: 25 U/L (ref 1–40)
BILIRUB SERPL-MCNC: 0.3 MG/DL (ref 0–1.2)
BUN SERPL-MCNC: 32 MG/DL (ref 8–23)
BUN/CREAT SERPL: 21.5 (ref 7–25)
CALCIUM SERPL-MCNC: 9.9 MG/DL (ref 8.6–10.5)
CHLORIDE SERPL-SCNC: 103 MMOL/L (ref 98–107)
CO2 SERPL-SCNC: 31 MMOL/L (ref 22–29)
CREAT SERPL-MCNC: 1.49 MG/DL (ref 0.76–1.27)
EGFRCR SERPLBLD CKD-EPI 2021: 53.1 ML/MIN/1.73
GLOBULIN SER CALC-MCNC: 2.4 GM/DL
GLUCOSE SERPL-MCNC: 90 MG/DL (ref 65–99)
POTASSIUM SERPL-SCNC: 5.6 MMOL/L (ref 3.5–5.2)
PROT SERPL-MCNC: 7.2 G/DL (ref 6–8.5)
SODIUM SERPL-SCNC: 139 MMOL/L (ref 136–145)

## 2023-01-30 DIAGNOSIS — R79.89 ELEVATED SERUM CREATININE: Primary | ICD-10-CM

## 2023-03-20 DIAGNOSIS — R19.7 DIARRHEA, UNSPECIFIED TYPE: Primary | ICD-10-CM

## 2023-03-21 DIAGNOSIS — R19.7 DIARRHEA, UNSPECIFIED TYPE: Primary | ICD-10-CM

## 2023-03-29 LAB
O+P SPEC MICRO: NORMAL
O+P STL CONC: NORMAL
SPECIMEN STATUS: NORMAL

## 2023-04-06 ENCOUNTER — TELEPHONE (OUTPATIENT)
Dept: GASTROENTEROLOGY | Facility: CLINIC | Age: 63
End: 2023-04-06

## 2023-04-06 NOTE — TELEPHONE ENCOUNTER
"Hub staff attempted to follow warm transfer process and was unsuccessful     Caller: Shon Sheikh \"Cr\"    Relationship to patient: Self    Best call back number: [phone]    Patient is needing: PT CALLED TO ASKING FOR A DIFFERENT APT DATE. PT WILL NOT BE ABLE TO MAKE THE APT FOR 4/11/23. PT REQUESTED FOR APT TO BE CANCELED. PT ASKED TO BE ON A WAIT LIST AND TO SPEAK WITH [facility]. PLEASE ADVISE.           "

## 2023-04-14 ENCOUNTER — OFFICE VISIT (OUTPATIENT)
Dept: FAMILY MEDICINE CLINIC | Facility: CLINIC | Age: 63
End: 2023-04-14
Payer: COMMERCIAL

## 2023-04-14 VITALS
HEART RATE: 64 BPM | BODY MASS INDEX: 29.18 KG/M2 | WEIGHT: 197 LBS | DIASTOLIC BLOOD PRESSURE: 86 MMHG | RESPIRATION RATE: 18 BRPM | HEIGHT: 69 IN | OXYGEN SATURATION: 99 % | SYSTOLIC BLOOD PRESSURE: 140 MMHG

## 2023-04-14 DIAGNOSIS — R19.5 LOOSE STOOLS: Primary | ICD-10-CM

## 2023-04-14 PROBLEM — N40.0 BENIGN PROSTATIC HYPERPLASIA: Status: ACTIVE | Noted: 2023-03-02

## 2023-04-14 PROBLEM — R79.89 HIGH SERUM CREATININE: Status: ACTIVE | Noted: 2023-03-02

## 2023-04-14 PROBLEM — E87.5 HYPERKALEMIA: Status: ACTIVE | Noted: 2023-04-12

## 2023-04-14 PROCEDURE — 99213 OFFICE O/P EST LOW 20 MIN: CPT | Performed by: NURSE PRACTITIONER

## 2023-04-14 NOTE — PROGRESS NOTES
Subjective   Shon Sheikh is a 62 y.o. male.     History of Present Illness   Patient presents with c/o loose stools since returning from [facility] in November of 2022. He had ova and parasite test that was negative.  Patient reports that his wife was treated with an antibiotic for similar symptoms and reports that her issue has resolved. He reports 2-3 stools daily. He denies nausea, vomiting or abdominal pain. He reports that his abdomen feels like it's churning. He took pepto bismol with symptoms were severe, he is not currently taking anything.  Patient reports that he's moving to [place] next Friday. He has gastroenterologist, but reports that he cannot get in before he moves. I explained that we will need to get a stool culture. He states he wants to be tested specifically for the organism his wife was positive for. I explained that stool culture will check for several including e-coli.  I explained again that I cannot test for 1 specific organism. He became angry and got up and left the room.      The following portions of the patient's history were reviewed and updated as appropriate: allergies, current medications, past family history, past medical history, past social history, past surgical history and problem list.    Review of Systems   Constitutional: Negative for chills, fatigue and fever.   Respiratory: Negative for cough, chest tightness, shortness of breath and wheezing.    Cardiovascular: Negative for chest pain, palpitations and leg swelling.   Gastrointestinal: Positive for diarrhea. Negative for abdominal distention, abdominal pain, anal bleeding, blood in stool, nausea, rectal pain, vomiting and indigestion.   Neurological: Negative for dizziness and headache.   Hematological: Negative.    Psychiatric/Behavioral: Negative.  Negative for sleep disturbance.       Objective   Physical Exam  Constitutional:       General: He is not in acute distress.     Appearance: He is well-developed. He is not  diaphoretic.   HENT:      Head: Normocephalic and atraumatic.   Eyes:      Conjunctiva/sclera: Conjunctivae normal.      Pupils: Pupils are equal, round, and reactive to light.   Pulmonary:      Effort: Pulmonary effort is normal.   Skin:     General: Skin is warm and dry.   Neurological:      Mental Status: He is alert and oriented to person, place, and time.   Psychiatric:         Behavior: Behavior normal.         Thought Content: Thought content normal.         Judgment: Judgment normal.         Vitals:    04/14/23 1025   BP: 140/86   Pulse: 64   Resp: 18   SpO2: 99%     Body mass index is 29.09 kg/m².      Procedures    Assessment & Plan   Problems Addressed this Visit    None  Visit Diagnoses     Loose stools    -  Primary      Diagnoses       Codes Comments    Loose stools    -  Primary ICD-10-CM: R19.5  ICD-9-CM: 787.7         Patient became angry when I explained that I cannot test for a specific organism. I explained that I do not have the access to that type of testing and explained again that he'd needs to see gastroenterology. I did recommend stool culture, which covers several organisms.   Patient got up and left the room and checked out.   Patient refused testing in office.   Patient left without examination       Return if symptoms worsen or fail to improve.  Answers for HPI/ROS submitted by the patient on 4/13/2023  What is the primary reason for your visit?: Other  Please describe your symptoms.: GI issues, loose stools, pressure to move bowels, general discomfort  Have you had these symptoms before?: Yes  How long have you been having these symptoms?: Greater than 2 weeks  Please list any medications you are currently taking for this condition.: None  Please describe any probable cause for these symptoms. : My wife and I both experienced diarrhea after spending five nights in [place].  Wife diagnosed with Enteroaggregative E. Coli.  She was treated with XIFAXA 200 mg and feels like the issue has  been resolved.  On March 19, 2023, I asked for a test kit to be tested for Enteroaggregative E. Coli.  Instead I was tested for OVA and parasites and tested Negative.  My symptoms have persisted for the last four weeks.

## 2023-04-18 ENCOUNTER — TELEPHONE (OUTPATIENT)
Dept: GASTROENTEROLOGY | Facility: CLINIC | Age: 63
End: 2023-04-18
Payer: COMMERCIAL

## 2023-04-18 NOTE — TELEPHONE ENCOUNTER
I CALLED THE PT TO RESCHEDULE AND HE IS LEAVING TOWN THIS Friday.  HE IS WANTING TO KNOW IF THE CAN JUST GET A GASTRO INTESTINAL PANEL TEST RUN.  WILL YOU CALL THE PT TO LET HIM KNOW.

## 2023-04-19 NOTE — TELEPHONE ENCOUNTER
Margarita is out of town.  I cannot find any records that we actually have seen this patient before.  He was seen by Dr. Donato in the past.  It looks like he was originally going to be scheduled with Dr. Duron and then they tried to reschedule him with Margarita for a sooner appointment.  Unfortunately we cannot order a GI PCR panel without actually seeing him.  It looks like his PCP was going to order the GI PCR but he got upset with her.  Maybe he could call her back and see if she would put the order in for him?

## 2023-04-20 ENCOUNTER — OFFICE VISIT (OUTPATIENT)
Dept: GASTROENTEROLOGY | Facility: CLINIC | Age: 63
End: 2023-04-20
Payer: COMMERCIAL

## 2023-04-20 VITALS
HEART RATE: 56 BPM | SYSTOLIC BLOOD PRESSURE: 138 MMHG | TEMPERATURE: 97.5 F | OXYGEN SATURATION: 97 % | BODY MASS INDEX: 29.62 KG/M2 | DIASTOLIC BLOOD PRESSURE: 80 MMHG | WEIGHT: 200 LBS | HEIGHT: 69 IN

## 2023-04-20 DIAGNOSIS — R19.7 DIARRHEA, UNSPECIFIED TYPE: Primary | ICD-10-CM

## 2023-04-20 DIAGNOSIS — K63.89 SMALL INTESTINAL BACTERIAL OVERGROWTH (SIBO): ICD-10-CM

## 2023-04-20 NOTE — PROGRESS NOTES
"Chief Complaint  Diarrhea    Subjective          History of Present Illness    Shon Sheikh is a  62 y.o. male presents for evaluation of loose stools since returning from [facility] in November of 2022.  He is a new patient and will follow with Dr. Duron.    He reports 2-3 loose stools daily since returning from trip to [place] November 2022. Associated with some urgency and pressure to go. General abdominal discomfort but denies ovet pain.  Symptoms were worse at onset and have improved some over time.  His wife also had similar symptoms and was treated for E. coli with Xifaxan previously-this resolved her symptoms.  He did have ova and parasite testing which was negative.  Denies nocturnal stools, melena hematochezia, abnormal weight loss, nausea, vomiting, fever (at any point).    He tried imodium previously which did not help.     Colonoscopy in 5/13/2021 with Dr. Donato showed grade 2 internal hemorrhoids, otherwise unremarkable.  Recall 10 years.    Father had pancreatic cancer at age 80. No FHCC, he denies tobacco or drug use.    He is moving to [place], [place] in 2 days.      Objective   Vital Signs:   /80 (BP Location: Left arm, Patient Position: Sitting, Cuff Size: Large Adult)   Pulse 56   Temp 97.5 °F (36.4 °C) (Temporal)   Ht 175.3 cm (69\")   Wt 90.7 kg (200 lb)   SpO2 97%   BMI 29.53 kg/m²       Physical Exam  Vitals reviewed.   Constitutional:       General: He is awake. He is not in acute distress.     Appearance: Normal appearance. He is well-developed and well-groomed.   HENT:      Head: Normocephalic.   Pulmonary:      Effort: Pulmonary effort is normal. No respiratory distress.   Skin:     Coloration: Skin is not pale.   Neurological:      Mental Status: He is alert and oriented to person, place, and time.      Gait: Gait is intact.   Psychiatric:         Mood and Affect: Mood and affect normal.         Speech: Speech normal.         Behavior: Behavior is cooperative.         " Judgment: Judgment normal.          Result Review :             Assessment and Plan    Diagnoses and all orders for this visit:    1. Diarrhea, unspecified type (Primary)  -     Gastrointestinal Panel, PCR - Stool, Per Rectum    2. Small intestinal bacterial overgrowth (SIBO)    Other orders  -     riFAXIMin (Xifaxan) 550 MG tablet; Take 1 tablet by mouth Every 8 (Eight) Hours for 14 days.  Dispense: 42 tablet; Refill: 2    Will order GI PCR for persistent loose/frequent stools.  Discussed that E. coli is typically self-limited so he should have resolved this infection on it's own.  Discussed that I suspect he has postinfectious irritability.  He would benefit from a round of Xifaxan for SIBO component.  He agrees to proceed with this.    Moving to [place] in 2 days. If symptoms persist, follow up with GI in [place].       Follow Up   Return if symptoms worsen or fail to improve.    Dragon dictation used throughout this note.     Renetta Khan PA-C

## 2023-04-23 LAB
ADV 40+41 DNA STL QL NAA+NON-PROBE: NOT DETECTED
ASTRO TYP 1-8 RNA STL QL NAA+NON-PROBE: NOT DETECTED
C CAYETANENSIS DNA STL QL NAA+NON-PROBE: NOT DETECTED
C COLI+JEJ+UPSA DNA STL QL NAA+NON-PROBE: NOT DETECTED
C DIF TOX TCDA+TCDB STL QL NAA+NON-PROBE: NOT DETECTED
CRYPTOSP DNA STL QL NAA+NON-PROBE: NOT DETECTED
E COLI O157 DNA STL QL NAA+NON-PROBE: NORMAL
E HISTOLYT DNA STL QL NAA+NON-PROBE: NOT DETECTED
EAEC PAA PLAS AGGR+AATA ST NAA+NON-PRB: NOT DETECTED
EC STX1+STX2 GENES STL QL NAA+NON-PROBE: NOT DETECTED
EPEC EAE GENE STL QL NAA+NON-PROBE: NOT DETECTED
ETEC LTA+ST1A+ST1B TOX ST NAA+NON-PROBE: NOT DETECTED
G LAMBLIA DNA STL QL NAA+NON-PROBE: NOT DETECTED
NOROVIRUS GI+II RNA STL QL NAA+NON-PROBE: NOT DETECTED
P SHIGELLOIDES DNA STL QL NAA+NON-PROBE: NOT DETECTED
RVA RNA STL QL NAA+NON-PROBE: NOT DETECTED
S ENT+BONG DNA STL QL NAA+NON-PROBE: NOT DETECTED
SAPO I+II+IV+V RNA STL QL NAA+NON-PROBE: NOT DETECTED
SHIGELLA SP+EIEC IPAH ST NAA+NON-PROBE: NOT DETECTED
V CHOL+PARA+VUL DNA STL QL NAA+NON-PROBE: NOT DETECTED
V CHOLERAE DNA STL QL NAA+NON-PROBE: NOT DETECTED
Y ENTEROCOL DNA STL QL NAA+NON-PROBE: NOT DETECTED

## 2023-05-15 DIAGNOSIS — I10 ESSENTIAL HYPERTENSION: ICD-10-CM

## 2023-05-15 RX ORDER — METOPROLOL SUCCINATE 100 MG/1
100 TABLET, EXTENDED RELEASE ORAL DAILY
Qty: 30 TABLET | Refills: 0 | Status: SHIPPED | OUTPATIENT
Start: 2023-05-15

## 2023-08-21 NOTE — PROGRESS NOTES
Subjective:     Encounter Date:03/20/2018      Patient ID: Shon Sheikh is a 57 y.o. male.    Chief Complaint:  History of Present Illness    Is a 57-year-old with a history of hypertension, mild chronic kidney disease, who presents for an evaluation of high blood pressures.    The patient reports that he has had high blood pressure for the last couple years.  He recently moved to the area about a year ago when he began following up with CARLO Iqbal.  Even had been on a diuretic for a couple years now.  He also been on metoprolol and succinate 50 mg a day.  His initial lab work did show mild renal insufficiency with a creatinine around 1.37.  Over the course of his follow-up CT scan continued to have issues with elevated pressures primarily of his diastolic readings.  Amlodipine has been added and he is currently on 10 mg a day.  He was also switched to chlorthalidone.  Most recently he had a repeat basic metabolic panel performed on 2/19/2018 that showed a creatinine of 1.35.  At this point his chlorthalidone was discontinued and his metoprolol succinate dosage was increased to 100 mg a day.  Even prior to this change based on his blood pressure readings and appears his systolics pressures have declined to the 110s to 120s and his diastolic pressures have decreased to the 70s to 80s.  His blood pressures have remained this way following the discontinuation of chlorthalidone and the increase the metoprolol succinate.  He reports that he's been tolerating the change in his metoprolol dosage well.  He denies any lightheadedness, dizziness, increased fatigue, or dyspnea on exertion.  He also denies chest pain, palpitations, PND or orthopnea, presyncope or syncope, or lower extremity edema.  He reports that he previously used to work on a regular basis but since moving here a year ago and buying a house recently they have not gotten back into the routine.  He does have an appointment coming up with her  nephrologist to evaluate his renal insufficiency.    Review of Systems   Constitution: Negative for weakness and malaise/fatigue.   HENT: Negative for hearing loss, hoarse voice, nosebleeds and sore throat.    Eyes: Negative for pain.   Cardiovascular: Negative for chest pain, claudication, cyanosis, dyspnea on exertion, irregular heartbeat, leg swelling, near-syncope, orthopnea, palpitations, paroxysmal nocturnal dyspnea and syncope.   Respiratory: Negative for shortness of breath and snoring.    Endocrine: Negative for cold intolerance, heat intolerance, polydipsia, polyphagia and polyuria.   Skin: Negative for itching and rash.   Musculoskeletal: Negative for arthritis, falls, joint pain, joint swelling, muscle cramps, muscle weakness and myalgias.   Gastrointestinal: Negative for constipation, diarrhea, dysphagia, heartburn, hematemesis, hematochezia, melena, nausea and vomiting.   Genitourinary: Negative for frequency, hematuria and hesitancy.   Neurological: Negative for excessive daytime sleepiness, dizziness, headaches, light-headedness and numbness.   Psychiatric/Behavioral: Negative for depression. The patient is not nervous/anxious.           Current Outpatient Prescriptions:   •  amLODIPine (NORVASC) 10 MG tablet, TAKE 1 TABLET BY MOUTH DAILY., Disp: 30 tablet, Rfl: 4  •  aspirin 81 MG EC tablet, Take 81 mg by mouth Daily., Disp: , Rfl:   •  Cholecalciferol (VITAMIN D PO), Take  by mouth., Disp: , Rfl:   •  metoprolol succinate XL (TOPROL-XL) 100 MG 24 hr tablet, Take 1 tablet by mouth Daily., Disp: 30 tablet, Rfl: 0  •  Multiple Vitamin (MULTI VITAMIN MENS PO), Take  by mouth., Disp: , Rfl:   •  tamsulosin (FLOMAX) 0.4 MG capsule 24 hr capsule, TAKE 1 CAPSULE BY MOUTH EVERY NIGHT., Disp: 30 capsule, Rfl: 5    Past Medical History:   Diagnosis Date   • Hypertension      Past Surgical History:   Procedure Laterality Date   • HERNIA REPAIR     • RHINOPLASTY       Family History   Problem Relation Age of  "Onset   • No Known Problems Mother    • Pancreatic cancer Father      Social History   Substance Use Topics   • Smoking status: Never Smoker   • Smokeless tobacco: Never Used   • Alcohol use 1.8 oz/week     1 Glasses of wine, 1 Cans of beer, 1 Shots of liquor per week           ECG 12 Lead  Date/Time: 3/20/2018 9:50 AM  Performed by: SHAHEEN GUPTA  Authorized by: SHAHEEN GUPTA   Comparison: not compared with previous ECG   Rhythm: sinus bradycardia               Objective:         Visit Vitals  /78 (BP Location: Right arm, Patient Position: Sitting) Comment: lft 126 78   Pulse (!) 45   Ht 175.3 cm (69\")   Wt 87.5 kg (193 lb)   BMI 28.50 kg/m²          Physical Exam   Constitutional: He is oriented to person, place, and time. He appears well-developed and well-nourished.   HENT:   Head: Normocephalic and atraumatic.   Neck: No JVD present. Carotid bruit is not present.   Cardiovascular: Normal rate, regular rhythm, S1 normal and S2 normal.  Exam reveals no gallop.    No murmur heard.  Pulses:       Radial pulses are 2+ on the right side, and 2+ on the left side.   No bilateral lower extremity edema   Pulmonary/Chest: Effort normal and breath sounds normal.   Abdominal: Soft. Normal appearance.   Neurological: He is alert and oriented to person, place, and time.   Skin: Skin is warm, dry and intact.   Psychiatric: He has a normal mood and affect.       Lab Review:       Assessment:          Diagnosis Plan   1. Essential hypertension     2. Hyperlipidemia, unspecified hyperlipidemia type     3. Bradycardia     4. Chronic kidney disease, unspecified CKD stage            Plan:       1.  Hypertension.  Well controlled today on his current regimen.  At this point would not make any changes to his management.  Encouraged him to to start exercising once his schedule will allow.  Hopefully with exercise his blood pressure control will improve and be able to back off some on the dosage of his antihypertensive " medications.  2.  Bradycardia.  This is likely due to the high-dose metoprolol succinate.  He appears asymptomatic and appears to be tolerating this change.  I would just monitor him for the development of symptoms for the time being.  We discussed possible symptoms to look out for.    3.  Hyperlipidemia.  Patient is planning on working on this through diet and exercise.  4.  Chronic kidney disease.  Has an upcoming nephrology evaluation.    Nothing further to add at this point.  Would be happy to see the patient again as needed.          Special Stains Stage 1 - Results: Base On Clearance Noted Above